# Patient Record
Sex: FEMALE | Race: WHITE | NOT HISPANIC OR LATINO | Employment: OTHER | URBAN - METROPOLITAN AREA
[De-identification: names, ages, dates, MRNs, and addresses within clinical notes are randomized per-mention and may not be internally consistent; named-entity substitution may affect disease eponyms.]

---

## 2017-02-26 ENCOUNTER — HOSPITAL ENCOUNTER (INPATIENT)
Facility: HOSPITAL | Age: 82
LOS: 8 days | DRG: 871 | End: 2017-03-06
Attending: EMERGENCY MEDICINE | Admitting: EMERGENCY MEDICINE
Payer: MEDICARE

## 2017-02-26 DIAGNOSIS — R78.81 BACTEREMIA: ICD-10-CM

## 2017-02-26 DIAGNOSIS — B95.2 UTI (URINARY TRACT INFECTION) DUE TO ENTEROCOCCUS: ICD-10-CM

## 2017-02-26 DIAGNOSIS — J18.9 PNEUMONIA OF BOTH LOWER LOBES DUE TO INFECTIOUS ORGANISM: Primary | ICD-10-CM

## 2017-02-26 DIAGNOSIS — N30.00 ACUTE CYSTITIS WITHOUT HEMATURIA: ICD-10-CM

## 2017-02-26 DIAGNOSIS — N17.9 AKI (ACUTE KIDNEY INJURY): ICD-10-CM

## 2017-02-26 DIAGNOSIS — I47.20 V-TACH: ICD-10-CM

## 2017-02-26 DIAGNOSIS — F01.50 VASCULAR DEMENTIA WITHOUT BEHAVIORAL DISTURBANCE: Chronic | ICD-10-CM

## 2017-02-26 DIAGNOSIS — R65.21 SEPTIC SHOCK: ICD-10-CM

## 2017-02-26 DIAGNOSIS — J96.91 RESPIRATORY FAILURE WITH HYPOXIA, UNSPECIFIED CHRONICITY: ICD-10-CM

## 2017-02-26 DIAGNOSIS — J96.01 ACUTE RESPIRATORY FAILURE WITH HYPOXIA: ICD-10-CM

## 2017-02-26 DIAGNOSIS — R09.02 HYPOXIA: ICD-10-CM

## 2017-02-26 DIAGNOSIS — I48.91 NEW ONSET A-FIB: ICD-10-CM

## 2017-02-26 DIAGNOSIS — N17.9 ACUTE RENAL FAILURE, UNSPECIFIED ACUTE RENAL FAILURE TYPE: ICD-10-CM

## 2017-02-26 DIAGNOSIS — A41.9 SEPTIC SHOCK: ICD-10-CM

## 2017-02-26 DIAGNOSIS — N39.0 UTI (URINARY TRACT INFECTION) DUE TO ENTEROCOCCUS: ICD-10-CM

## 2017-02-26 DIAGNOSIS — R13.12 OROPHARYNGEAL DYSPHAGIA: ICD-10-CM

## 2017-02-26 LAB
ALBUMIN SERPL BCP-MCNC: 2 G/DL
ALLENS TEST: ABNORMAL
ALP SERPL-CCNC: 114 U/L
ALT SERPL W/O P-5'-P-CCNC: 10 U/L
ANION GAP SERPL CALC-SCNC: 9 MMOL/L
AST SERPL-CCNC: 20 U/L
BACTERIA #/AREA URNS HPF: ABNORMAL /HPF
BASOPHILS # BLD AUTO: 0.02 K/UL
BASOPHILS NFR BLD: 0.2 %
BILIRUB SERPL-MCNC: 0.6 MG/DL
BILIRUB UR QL STRIP: ABNORMAL
BNP SERPL-MCNC: 442 PG/ML
BUN SERPL-MCNC: 57 MG/DL
CALCIUM SERPL-MCNC: 8.8 MG/DL
CHLORIDE SERPL-SCNC: 106 MMOL/L
CK SERPL-CCNC: 28 U/L
CLARITY UR: CLEAR
CO2 SERPL-SCNC: 24 MMOL/L
COLOR UR: YELLOW
CREAT SERPL-MCNC: 1.6 MG/DL
DELSYS: ABNORMAL
DIFFERENTIAL METHOD: ABNORMAL
EOSINOPHIL # BLD AUTO: 0 K/UL
EOSINOPHIL NFR BLD: 0 %
ERYTHROCYTE [DISTWIDTH] IN BLOOD BY AUTOMATED COUNT: 13.7 %
EST. GFR  (AFRICAN AMERICAN): 32 ML/MIN/1.73 M^2
EST. GFR  (NON AFRICAN AMERICAN): 28 ML/MIN/1.73 M^2
FIO2: 32
FLOW: 3
FLUAV AG SPEC QL IA: NEGATIVE
FLUBV AG SPEC QL IA: NEGATIVE
GLUCOSE SERPL-MCNC: 130 MG/DL
GLUCOSE UR QL STRIP: NEGATIVE
HCO3 UR-SCNC: 23 MMOL/L (ref 24–28)
HCT VFR BLD AUTO: 34.3 %
HGB BLD-MCNC: 11.4 G/DL
HGB UR QL STRIP: ABNORMAL
HYALINE CASTS #/AREA URNS LPF: 0 /LPF
KETONES UR QL STRIP: NEGATIVE
LACTATE SERPL-SCNC: 1.1 MMOL/L
LACTATE SERPL-SCNC: 1.4 MMOL/L
LEUKOCYTE ESTERASE UR QL STRIP: ABNORMAL
LYMPHOCYTES # BLD AUTO: 1.4 K/UL
LYMPHOCYTES NFR BLD: 13.1 %
MCH RBC QN AUTO: 30.8 PG
MCHC RBC AUTO-ENTMCNC: 33.2 %
MCV RBC AUTO: 93 FL
MICROSCOPIC COMMENT: ABNORMAL
MODE: ABNORMAL
MONOCYTES # BLD AUTO: 1.5 K/UL
MONOCYTES NFR BLD: 14.6 %
NEUTROPHILS # BLD AUTO: 7.4 K/UL
NEUTROPHILS NFR BLD: 73.2 %
NITRITE UR QL STRIP: NEGATIVE
PCO2 BLDA: 42.7 MMHG (ref 35–45)
PH SMN: 7.34 [PH] (ref 7.35–7.45)
PH UR STRIP: 6 [PH] (ref 5–8)
PLATELET # BLD AUTO: 272 K/UL
PLATELET BLD QL SMEAR: ABNORMAL
PMV BLD AUTO: 9.1 FL
PO2 BLDA: 58 MMHG (ref 80–100)
POC BE: -3 MMOL/L
POC SATURATED O2: 88 % (ref 95–100)
POTASSIUM SERPL-SCNC: 4.3 MMOL/L
PROT SERPL-MCNC: 6.3 G/DL
PROT UR QL STRIP: ABNORMAL
RBC # BLD AUTO: 3.7 M/UL
RBC #/AREA URNS HPF: 4 /HPF (ref 0–4)
SAMPLE: ABNORMAL
SITE: ABNORMAL
SODIUM SERPL-SCNC: 139 MMOL/L
SP GR UR STRIP: 1.02 (ref 1–1.03)
SPECIMEN SOURCE: NORMAL
TROPONIN I SERPL DL<=0.01 NG/ML-MCNC: 0.04 NG/ML
URN SPEC COLLECT METH UR: ABNORMAL
UROBILINOGEN UR STRIP-ACNC: 1 EU/DL
WBC # BLD AUTO: 10.31 K/UL
WBC #/AREA URNS HPF: 50 /HPF (ref 0–5)
WBC CLUMPS URNS QL MICRO: ABNORMAL

## 2017-02-26 PROCEDURE — 20000000 HC ICU ROOM

## 2017-02-26 PROCEDURE — 25000003 PHARM REV CODE 250: Performed by: NURSE PRACTITIONER

## 2017-02-26 PROCEDURE — 25000003 PHARM REV CODE 250: Performed by: EMERGENCY MEDICINE

## 2017-02-26 PROCEDURE — 87205 SMEAR GRAM STAIN: CPT

## 2017-02-26 PROCEDURE — 99291 CRITICAL CARE FIRST HOUR: CPT | Mod: 25

## 2017-02-26 PROCEDURE — 93005 ELECTROCARDIOGRAM TRACING: CPT

## 2017-02-26 PROCEDURE — 87070 CULTURE OTHR SPECIMN AEROBIC: CPT

## 2017-02-26 PROCEDURE — 87088 URINE BACTERIA CULTURE: CPT

## 2017-02-26 PROCEDURE — 85025 COMPLETE CBC W/AUTO DIFF WBC: CPT

## 2017-02-26 PROCEDURE — 51702 INSERT TEMP BLADDER CATH: CPT

## 2017-02-26 PROCEDURE — 31720 CLEARANCE OF AIRWAYS: CPT

## 2017-02-26 PROCEDURE — 99291 CRITICAL CARE FIRST HOUR: CPT | Mod: ,,, | Performed by: NURSE PRACTITIONER

## 2017-02-26 PROCEDURE — 81000 URINALYSIS NONAUTO W/SCOPE: CPT

## 2017-02-26 PROCEDURE — 96366 THER/PROPH/DIAG IV INF ADDON: CPT

## 2017-02-26 PROCEDURE — 82550 ASSAY OF CK (CPK): CPT

## 2017-02-26 PROCEDURE — 87186 SC STD MICRODIL/AGAR DIL: CPT

## 2017-02-26 PROCEDURE — 27000221 HC OXYGEN, UP TO 24 HOURS

## 2017-02-26 PROCEDURE — 94640 AIRWAY INHALATION TREATMENT: CPT

## 2017-02-26 PROCEDURE — 84484 ASSAY OF TROPONIN QUANT: CPT

## 2017-02-26 PROCEDURE — 27100108

## 2017-02-26 PROCEDURE — 63600175 PHARM REV CODE 636 W HCPCS: Performed by: EMERGENCY MEDICINE

## 2017-02-26 PROCEDURE — 25000242 PHARM REV CODE 250 ALT 637 W/ HCPCS: Performed by: EMERGENCY MEDICINE

## 2017-02-26 PROCEDURE — 87077 CULTURE AEROBIC IDENTIFY: CPT | Mod: 59

## 2017-02-26 PROCEDURE — 83880 ASSAY OF NATRIURETIC PEPTIDE: CPT

## 2017-02-26 PROCEDURE — 96368 THER/DIAG CONCURRENT INF: CPT

## 2017-02-26 PROCEDURE — 80053 COMPREHEN METABOLIC PANEL: CPT

## 2017-02-26 PROCEDURE — 99900026 HC AIRWAY MAINTENANCE (STAT)

## 2017-02-26 PROCEDURE — 96365 THER/PROPH/DIAG IV INF INIT: CPT | Mod: 59

## 2017-02-26 PROCEDURE — 87400 INFLUENZA A/B EACH AG IA: CPT | Mod: 59

## 2017-02-26 PROCEDURE — 82803 BLOOD GASES ANY COMBINATION: CPT

## 2017-02-26 PROCEDURE — 36600 WITHDRAWAL OF ARTERIAL BLOOD: CPT

## 2017-02-26 PROCEDURE — 63600175 PHARM REV CODE 636 W HCPCS: Performed by: NURSE PRACTITIONER

## 2017-02-26 PROCEDURE — 87086 URINE CULTURE/COLONY COUNT: CPT

## 2017-02-26 PROCEDURE — 99900035 HC TECH TIME PER 15 MIN (STAT)

## 2017-02-26 PROCEDURE — 87040 BLOOD CULTURE FOR BACTERIA: CPT

## 2017-02-26 PROCEDURE — 25000242 PHARM REV CODE 250 ALT 637 W/ HCPCS: Performed by: NURSE PRACTITIONER

## 2017-02-26 PROCEDURE — 83605 ASSAY OF LACTIC ACID: CPT

## 2017-02-26 PROCEDURE — 93010 ELECTROCARDIOGRAM REPORT: CPT | Mod: ,,, | Performed by: INTERNAL MEDICINE

## 2017-02-26 RX ORDER — FUROSEMIDE 40 MG/1
40 TABLET ORAL DAILY PRN
Status: ON HOLD | COMMUNITY
End: 2017-03-06 | Stop reason: HOSPADM

## 2017-02-26 RX ORDER — LOPERAMIDE HYDROCHLORIDE 2 MG/1
2 CAPSULE ORAL 4 TIMES DAILY PRN
Status: ON HOLD | COMMUNITY
End: 2017-03-06 | Stop reason: HOSPADM

## 2017-02-26 RX ORDER — IPRATROPIUM BROMIDE AND ALBUTEROL SULFATE 2.5; .5 MG/3ML; MG/3ML
3 SOLUTION RESPIRATORY (INHALATION)
Status: DISCONTINUED | OUTPATIENT
Start: 2017-02-26 | End: 2017-03-06 | Stop reason: HOSPADM

## 2017-02-26 RX ORDER — HEPARIN SODIUM 5000 [USP'U]/ML
5000 INJECTION, SOLUTION INTRAVENOUS; SUBCUTANEOUS EVERY 8 HOURS
Status: DISCONTINUED | OUTPATIENT
Start: 2017-02-26 | End: 2017-03-05

## 2017-02-26 RX ORDER — NAPROXEN SODIUM 220 MG/1
81 TABLET, FILM COATED ORAL DAILY
Status: ON HOLD | COMMUNITY
End: 2017-03-06 | Stop reason: HOSPADM

## 2017-02-26 RX ORDER — LINEZOLID 2 MG/ML
600 INJECTION, SOLUTION INTRAVENOUS
Status: DISCONTINUED | OUTPATIENT
Start: 2017-02-26 | End: 2017-02-28

## 2017-02-26 RX ORDER — CIPROFLOXACIN 2 MG/ML
400 INJECTION, SOLUTION INTRAVENOUS
Status: DISCONTINUED | OUTPATIENT
Start: 2017-02-26 | End: 2017-02-28

## 2017-02-26 RX ORDER — FAMOTIDINE 10 MG/ML
20 INJECTION INTRAVENOUS DAILY
Status: DISCONTINUED | OUTPATIENT
Start: 2017-02-27 | End: 2017-02-27

## 2017-02-26 RX ORDER — MEROPENEM AND SODIUM CHLORIDE 500 MG/50ML
500 INJECTION, SOLUTION INTRAVENOUS
Status: DISCONTINUED | OUTPATIENT
Start: 2017-02-26 | End: 2017-02-26

## 2017-02-26 RX ORDER — ONDANSETRON 2 MG/ML
4 INJECTION INTRAMUSCULAR; INTRAVENOUS EVERY 8 HOURS PRN
Status: DISCONTINUED | OUTPATIENT
Start: 2017-02-26 | End: 2017-03-06 | Stop reason: HOSPADM

## 2017-02-26 RX ORDER — MEROPENEM AND SODIUM CHLORIDE 500 MG/50ML
500 INJECTION, SOLUTION INTRAVENOUS
Status: DISCONTINUED | OUTPATIENT
Start: 2017-02-27 | End: 2017-02-28

## 2017-02-26 RX ORDER — LORATADINE 10 MG/1
10 TABLET ORAL DAILY
Status: ON HOLD | COMMUNITY
End: 2017-03-06 | Stop reason: HOSPADM

## 2017-02-26 RX ORDER — ACETAMINOPHEN 325 MG/1
650 TABLET ORAL EVERY 6 HOURS PRN
Status: DISCONTINUED | OUTPATIENT
Start: 2017-02-26 | End: 2017-03-06 | Stop reason: HOSPADM

## 2017-02-26 RX ORDER — ACETAMINOPHEN 325 MG/1
325 TABLET ORAL EVERY 6 HOURS PRN
COMMUNITY

## 2017-02-26 RX ORDER — BISACODYL 10 MG
10 SUPPOSITORY, RECTAL RECTAL DAILY PRN
Status: DISCONTINUED | OUTPATIENT
Start: 2017-02-26 | End: 2017-03-06 | Stop reason: HOSPADM

## 2017-02-26 RX ORDER — CARVEDILOL 12.5 MG/1
12.5 TABLET ORAL 2 TIMES DAILY WITH MEALS
COMMUNITY

## 2017-02-26 RX ORDER — SODIUM CHLORIDE 9 MG/ML
INJECTION, SOLUTION INTRAVENOUS CONTINUOUS
Status: DISCONTINUED | OUTPATIENT
Start: 2017-02-26 | End: 2017-03-03

## 2017-02-26 RX ORDER — IPRATROPIUM BROMIDE AND ALBUTEROL SULFATE 2.5; .5 MG/3ML; MG/3ML
3 SOLUTION RESPIRATORY (INHALATION)
Status: COMPLETED | OUTPATIENT
Start: 2017-02-26 | End: 2017-02-26

## 2017-02-26 RX ORDER — OMEPRAZOLE 20 MG/1
20 CAPSULE, DELAYED RELEASE ORAL DAILY
COMMUNITY

## 2017-02-26 RX ORDER — ATORVASTATIN CALCIUM 20 MG/1
20 TABLET, FILM COATED ORAL DAILY
Status: ON HOLD | COMMUNITY
End: 2017-03-06 | Stop reason: HOSPADM

## 2017-02-26 RX ORDER — TRAMADOL HYDROCHLORIDE 50 MG/1
50 TABLET ORAL EVERY 6 HOURS PRN
COMMUNITY

## 2017-02-26 RX ORDER — MELOXICAM 7.5 MG/1
7.5 TABLET ORAL DAILY
Status: ON HOLD | COMMUNITY
End: 2017-03-06 | Stop reason: HOSPADM

## 2017-02-26 RX ADMIN — MEROPENEM AND SODIUM CHLORIDE 500 MG: 500 INJECTION, SOLUTION INTRAVENOUS at 12:02

## 2017-02-26 RX ADMIN — LINEZOLID 600 MG: 600 INJECTION, SOLUTION INTRAVENOUS at 05:02

## 2017-02-26 RX ADMIN — IPRATROPIUM BROMIDE AND ALBUTEROL SULFATE 3 ML: .5; 3 SOLUTION RESPIRATORY (INHALATION) at 08:02

## 2017-02-26 RX ADMIN — HEPARIN SODIUM 5000 UNITS: 5000 INJECTION, SOLUTION INTRAVENOUS; SUBCUTANEOUS at 10:02

## 2017-02-26 RX ADMIN — SODIUM CHLORIDE 2163 ML: 0.9 INJECTION, SOLUTION INTRAVENOUS at 10:02

## 2017-02-26 RX ADMIN — IPRATROPIUM BROMIDE AND ALBUTEROL SULFATE 3 ML: .5; 3 SOLUTION RESPIRATORY (INHALATION) at 10:02

## 2017-02-26 RX ADMIN — CIPROFLOXACIN 400 MG: 2 INJECTION, SOLUTION INTRAVENOUS at 10:02

## 2017-02-26 RX ADMIN — VANCOMYCIN HYDROCHLORIDE 1 G: 1 INJECTION, POWDER, LYOPHILIZED, FOR SOLUTION INTRAVENOUS at 11:02

## 2017-02-26 RX ADMIN — SODIUM CHLORIDE: 0.9 INJECTION, SOLUTION INTRAVENOUS at 03:02

## 2017-02-26 NOTE — PLAN OF CARE
Problem: Patient Care Overview  Goal: Plan of Care Review  Outcome: Ongoing (interventions implemented as appropriate)  Patient remains on ventimask, vss.  NT suctioned x1.  Daughter and son in law given update at bedside.

## 2017-02-26 NOTE — IP AVS SNAPSHOT
[facility]  [id number] [place] [address] [address] [place] [address]           Patient Discharge Instructions     Our goal is to set you up for success. This packet includes information on your condition, medications, and your home care. It will help you to care for yourself so you don't get sicker and need to go back to the hospital.     Please ask your nurse if you have any questions.        There are many details to remember when preparing to leave the hospital. Here is what you will need to do:    1. Take your medicine. If you are prescribed medications, review your Medication List in the following pages. You may have new medications to  at the pharmacy and others that you'll need to stop taking. Review the instructions for how and when to take your medications. Talk with your doctor or nurses if you are unsure of what to do.     2. Go to your follow-up appointments. Specific follow-up information is listed in the following pages. Your may be contacted by a transition nurse or clinical provider about future appointments. Be sure we have all of the phone numbers to reach you, if needed. Please contact your provider's office if you are unable to make an appointment.     3. Watch for warning signs. Your doctor or nurse will give you detailed warning signs to watch for and when to call for assistance. These instructions may also include educational information about your condition. If you experience any of warning signs to your health, call your doctor.               ** Verify the list of medication(s) below is accurate and up to date. Carry this with you in case of emergency. If your medications have changed, please notify your healthcare provider.             Medication List      START taking these medications        Additional Info                      albuterol-ipratropium 2.5mg-0.5mg/3mL 0.5 mg-3 mg(2.5 mg base)/3 mL nebulizer solution   Commonly known as:  DUO-NEB   Quantity:  10 vial    Refills:  1   Dose:  3 mL    Last time this was given:  3 mLs on 3/6/2017  8:35 AM   Instructions:  Take 3 mLs by nebulization every 6 (six) hours while awake. Rescue     Begin Date    AM    Noon    PM    Bedtime       bisacodyl 10 mg Supp   Commonly known as:  DULCOLAX   Refills:  0   Dose:  10 mg    Instructions:  Place 1 suppository (10 mg total) rectally daily as needed.     Begin Date    AM    Noon    PM    Bedtime       minocycline 100 MG tablet   Commonly known as:  DYNACIN   Quantity:  20 tablet   Refills:  0   Dose:  100 mg    Instructions:  Take 1 tablet (100 mg total) by mouth every 12 (twelve) hours.     Begin Date    AM    Noon    PM    Bedtime         CONTINUE taking these medications        Additional Info                      acetaminophen 325 MG tablet   Commonly known as:  TYLENOL   Refills:  0   Dose:  325 mg    Instructions:  Take 325 mg by mouth every 6 (six) hours as needed for Pain.     Begin Date    AM    Noon    PM    Bedtime       carvedilol 12.5 MG tablet   Commonly known as:  COREG   Refills:  0   Dose:  12.5 mg    Instructions:  Take 12.5 mg by mouth 2 (two) times daily with meals.     Begin Date    AM    Noon    PM    Bedtime       omeprazole 20 MG capsule   Commonly known as:  PRILOSEC   Refills:  0   Dose:  20 mg    Instructions:  Take 20 mg by mouth once daily.     Begin Date    AM    Noon    PM    Bedtime       tramadol 50 mg tablet   Commonly known as:  ULTRAM   Refills:  0   Dose:  50 mg    Instructions:  Take 50 mg by mouth every 6 (six) hours as needed for Pain.     Begin Date    AM    Noon    PM    Bedtime         STOP taking these medications     aspirin 81 MG Chew       atorvastatin 20 MG tablet   Commonly known as:  LIPITOR       furosemide 40 MG tablet   Commonly known as:  LASIX       loperamide 2 mg capsule   Commonly known as:  IMODIUM       loratadine 10 mg tablet   Commonly known as:  CLARITIN       meloxicam 7.5 MG tablet   Commonly known as:  MOBIC            Where  to Get Your Medications      These medications were sent to [id number] - [place], [place] - [address]  [address] [facility] [address]     Phone:  [phone]     albuterol-ipratropium 2.5mg-0.5mg/3mL 0.5 mg-3 mg(2.5 mg base)/3 mL nebulizer solution    minocycline 100 MG tablet         You can get these medications from any pharmacy     You don't need a prescription for these medications     bisacodyl 10 mg Supp                  Please bring to all follow up appointments:    1. A copy of your discharge instructions.  2. All medicines you are currently taking in their original bottles.  3. Identification and insurance card.    Please arrive 15 minutes ahead of scheduled appointment time.    Please call 24 hours in advance if you must reschedule your appointment and/or time.        Follow-up Information     Follow up with [facility].    Specialties:  Nursing Home Agency, SNF Agency    Why:  Nursing Home    Contact information:    [address] [facility] [address] [phone]          Follow up with [facility] .    Why:  Hospice at [facility]    Contact information:    [address] [place]  [address] [phone]        Discharge Instructions     Future Orders    Activity as tolerated     Diet general     Scheduling Instructions:    Pureed diet    Questions:    Total calories:      Fat restriction, if any:      Protein restriction, if any:      Na restriction, if any:      Fluid restriction:      Additional restrictions:          Primary Diagnosis     Your primary diagnosis was:  Pneumonia Of Both Lower Lobes Due To Infectious Organism      Admission Information     Date & Time Provider Department [facility]    2/26/2017  9:39 AM Wyatt Schilling MD [facility] -  [id number]      Care Providers     Provider Role Specialty Primary office phone    Wyatt Schilling MD Attending Provider General Practice [phone]    Harinder Quinones MD Consulting  Physician  Infectious Diseases [phone]    Wyatt Schilling MD Team Attending  General Practice [phone]      Important Medicare Message          Most Recent Value    Important Message from Medicare Regarding Discharge Appeal Rights  Given to patient/caregiver, Explained to patient/caregiver, Signed/date by patient/caregiver yes 03/03/2017 1059      Your Vitals Were     BP Pulse Temp Resp Height Weight    127/54 (BP Location: Right arm, Patient Position: Lying, BP Method: Automatic) 81 97.9 °F (36.6 °C) (Oral) 18 5' (1.524 m) 73.9 kg (162 lb 14.7 oz)    SpO2 BMI             98% 31.82 kg/m2         Recent Lab Values     No lab values to display.      Allergies as of 3/6/2017        Reactions    Lidocaine     Hallucinations    Penicillins Rash    Sulfa (Sulfonamide Antibiotics) Rash      [facility]     [facility] Nurse Care Line - 24/7 Assistance  Unless otherwise directed by your provider, please contact [facility], our nurse care line that is available for 24/7 assistance.     Registered nurses in the [facility] provide clinical advisement, health education, appointment booking, and other advisory services.  Call for this free service at [phone].        Advance Directives     An advance directive is a document which, in the event you are no longer able to make decisions for yourself, tells your healthcare team what kind of treatment you do or do not want to receive, or who you would like to make those decisions for you.  If you do not currently have an advance directive, [facility] encourages you to create one.  For more information call:  [phone]), [phone] ([phone]),  or log on to [email or web address].        Language Assistance Services     ATTENTION: Language assistance services are available, free of charge. Please call [phone].      ATENCIÓN: Si habla español, tiene a moreno disposición servicios gratuitos de asistencia lingüística. Llame al  [phone].     [facility] Ý: N?u b?n nói Ti?ng Vi?t, có các d?ch v? h? tr? ngôn ng? mi?n phí dành cho b?n. G?i s? [phone].        Pneumonmia Discharge Instructions                [facility] Sign-Up     Activating your [facility] account is as easy as 1-2-3!     1) Visit [email or web address], select Sign Up Now, enter this activation code and your date of birth, then select Next.  [email or web address]  Expires: 4/20/2017 11:49 AM      2) Create a username and password to use when you visit [facility] in the future and select a security question in case you lose your password and select Next.    3) Enter your e-mail address and click Sign Up!    Additional Information  If you have questions, please e-mail [email or web address] or call [phone] to talk to our [facility] staff. Remember, [facility] is NOT to be used for urgent needs. For medical emergencies, dial 911.          [facility] complies with applicable Federal civil rights laws and does not discriminate on the basis of race, color, national origin, age, disability, or sex.

## 2017-02-26 NOTE — CONSULTS
Clinical Pharmacy Consult Note: Vancomycin     Pharmacy consulted by Dr. Luciano to dose vancomycin for treatment of pneumonia.     89 y.o. female admitted from nursing home for pneumonia     The patient has the following labs and vitals:    Date: 2/26/2017   SCer: Estimated Creatinine Clearance: 19.5 mL/min (based on Cr of 1.6).     Lab Results   Component Value Date    BUN 57 (H) 02/26/2017        Ideal BW: 45.5 Kg   Actual BW: 72.1 Kg   Adjusted BW: 56.1 Kg     Will use 56.1 Kg for dosing weight.     Lab Results   Component Value Date    WBC 10.31 02/26/2017      Tmax/24h: 99.8   Cultures: Blood drawn 02/26/2017      Anti-Infectives:     Vancomycin (day 1 of therapy)   Meropenem (day 1 of therapy)  Ciprofloxacin (day 1 of therapy)    Based on Estimated Creatinine Clearance: 19.5 mL/min (based on Cr of 1.6). and weight of 56.1 Kg, pharmacy will initiate vancomycin pulse dosing 1000 mg IV ED one time with a placeholder dose entered for 750 mg IV every 48 hours.      Vancomycin random level due 2/27/17 at 0430 with AM labs. This will be to establish the patient's actual vancomycin clearance. Calculated half-life elimination is 40.8 hours.     Pharmacy will continue to follow patients clinical status, renal function, C&S, and adjust dose as necessary to maintain trough levels between 15 and 20 mcg/mL.     Thank you for allowing us to participate in this patient's care.     Amalia Gottlieb   2/26/2017 11:47 AM

## 2017-02-26 NOTE — ED NOTES
Pt deep suctioned with a 14 F by RT Layne. Large amount of copious secretions suctioned, pt's o2 sat and breath sounds immediately improved

## 2017-02-26 NOTE — ASSESSMENT & PLAN NOTE
Sec to Pneumonia and UTI  S/p Volume Resuscitation-- doing much better  May need pressors but she is DNR  Continue triple Abx

## 2017-02-26 NOTE — PROGRESS NOTES
Called to patient's room to reevaluate patient. BBS coarse/rhonci (no wheezing heard). Spo2 86-88% on Venti mask 15L 55%. Oral tracheal suctioned patient w/a 14 Occitan suction catheter. Suctioning tolerated well. Suctioned a very large amount of yellow-white sputum. Post suctioning, Spo2 93% on Venti mask 15L 55%. Patient states she feels better. BBS improved post suctioning.

## 2017-02-26 NOTE — ED PROVIDER NOTES
SCRIBE #1 NOTE: I, Sonya Meyers, am scribing for, and in the presence of, Iban Luciano MD. I have scribed the entire note.      History      Chief Complaint   Patient presents with    Shortness of Breath     short of breath with crackles, nonproductive cough x1 week, fever       Review of patient's allergies indicates:   Allergen Reactions    Lidocaine      Hallucinations    Penicillins Rash    Sulfa (sulfonamide antibiotics) Rash        HPI   HPI    2/26/2017, 10:19 AM   History obtained from the family      History of Present Illness: Amy Guzman is a 89 y.o. female patient who presents to the Emergency Department for SOB which onset gradually 1 week ago. Per [facility], patient spO2 was 60% at the nursing home. They report upon their arrival her spO2 was 77% on 4L O2. They report giving patient a DuoNeb treatment, Albuterol, and 125mg Solumedrol. Sx have been constant and moderate in severity. No mitigating or exacerbating factors reported. Associated sx include dry cough and subjective fever. Pt denies any chills, fatigue, nausea, vomiting, CP, leg pain/swelling, and all other sx. No further complaints or concerns at this time.        Arrival mode: [facility]    PCP: No primary care provider on file.     Past Medical History:  Past medical history reviewed not relevant      Past Surgical History:  Past surgical history reviewed not relevant      Family History:  Family history reviewed not relevant      Social History:  Social History    Social History Main Topics    Social History Main Topics    Smoking status: Unknown if ever smoked    Smokeless tobacco: Unknown if ever used    Alcohol Use: Unknown drinking history    Drug Use: Unknown if ever used    Sexual Activity: Unknown       ROS   Review of Systems   Constitutional: Positive for fever. Negative for chills and fatigue.   HENT: Negative for sore throat.    Respiratory: Positive for cough and shortness of breath.    Cardiovascular: Negative for  chest pain and leg swelling.   Gastrointestinal: Negative for nausea and vomiting.   Genitourinary: Negative for dysuria.   Musculoskeletal: Negative for back pain.        Negative for leg pain.   Skin: Negative for rash.   Neurological: Negative for weakness.   Hematological: Does not bruise/bleed easily.       Physical Exam    Initial Vitals   BP Pulse Resp Temp SpO2   02/26/17 0940 02/26/17 0940 02/26/17 0940 02/26/17 0940 02/26/17 0940   109/55 101 23 99.8 °F (37.7 °C) 96 %      Physical Exam  Nursing Notes and Vital Signs Reviewed.  Constitutional: Patient is in mild distress. Awake and alert. Well-developed and well-nourished.  Head: Atraumatic. Normocephalic.  Eyes: PERRL. EOM intact. Conjunctivae are not pale. No scleral icterus.  ENT: Mucous membranes are moist. Oropharynx is clear and symmetric.    Neck: Supple. Full ROM. No lymphadenopathy.  Cardiovascular: Regular rate. Regular rhythm. No murmurs, rubs, or gallops. Distal pulses are 2+ and symmetric.  Pulmonary/Chest: Mild respiratory distress. Coarse breath sounds. No wheezing, rales, or rhonchi.  Abdominal: Soft and non-distended.  There is no tenderness.  No rebound, guarding, or rigidity. Good bowel sounds.  Genitourinary: No CVA tenderness  Musculoskeletal: Moves all extremities. No obvious deformities. 1+ BLE pitting edema. No calf tenderness.  Skin: Warm and dry.  Neurological:  Alert, awake, and appropriate.  Normal speech.  No acute focal neurological deficits are appreciated.  Psychiatric: Normal affect. Good eye contact. Appropriate in content.    ED Course    Critical Care  Date/Time: 2/26/2017 10:51 AM  Performed by: CIARA REAVES  Authorized by: CIARA REAVES   Direct patient critical care time: 12 minutes  Additional history critical care time: 8 minutes  Ordering / reviewing critical care time: 8 minutes  Documentation critical care time: 8 minutes  Consulting other physicians critical care time: 8 minutes  Consult with family  critical care time: 8 minutes  Other critical care time: 8 minutes  Total critical care time (exclusive of procedural time) : 60 minutes  Critical care time was exclusive of separately billable procedures and treating other patients.  Critical care was necessary to treat or prevent imminent or life-threatening deterioration of the following conditions: respiratory failure (hypoxia).  Critical care was time spent personally by me on the following activities: blood draw for specimens, discussions with consultants, evaluation of patient's response to treatment, obtaining history from patient or surrogate, ordering and review of laboratory studies, pulse oximetry, review of old charts, development of treatment plan with patient or surrogate, interpretation of cardiac output measurements, examination of patient, ordering and performing treatments and interventions, ordering and review of radiographic studies and re-evaluation of patient's condition.        ED Vital Signs:  Vitals:    02/26/17 0940 02/26/17 0952 02/26/17 1002 02/26/17 1003   BP: (!) 109/55 (!) 114/45 (!) 98/44    Pulse: 101 105 99 96   Resp: (!) 23 20 (!) 27 (!) 28   Temp: 99.8 °F (37.7 °C)      TempSrc: Oral      SpO2: 96% (!) 84% (!) 93%    Weight: 72.1 kg (159 lb)      Height: 5' (1.524 m)       02/26/17 1017 02/26/17 1047 02/26/17 1102 02/26/17 1217   BP: (!) 88/35 (!) 93/33 (!) 117/41 (!) 109/42   Pulse: 84 78 82 73   Resp: (!) 27 (!) 25 (!) 26 (!) 25   Temp:       TempSrc:       SpO2: (!) 92% (!) 90% (!) 92% (!) 93%   Weight:       Height:        02/26/17 1232   BP: (!) 137/55   Pulse: 83   Resp: (!) 23   Temp:    TempSrc:    SpO2: 98%   Weight:    Height:        Abnormal Lab Results:  Labs Reviewed   CBC W/ AUTO DIFFERENTIAL - Abnormal; Notable for the following:        Result Value    RBC 3.70 (*)     Hemoglobin 11.4 (*)     Hematocrit 34.3 (*)     MPV 9.1 (*)     Mono # 1.5 (*)     Gran% 73.2 (*)     Lymph% 13.1 (*)     All other components  within normal limits   COMPREHENSIVE METABOLIC PANEL - Abnormal; Notable for the following:     Glucose 130 (*)     BUN, Bld 57 (*)     Creatinine 1.6 (*)     Albumin 2.0 (*)     eGFR if  32 (*)     eGFR if non  28 (*)     All other components within normal limits   B-TYPE NATRIURETIC PEPTIDE - Abnormal; Notable for the following:      (*)     All other components within normal limits   TROPONIN I - Abnormal; Notable for the following:     Troponin I 0.037 (*)     All other components within normal limits   ISTAT PROCEDURE - Abnormal; Notable for the following:     POC PH 7.340 (*)     POC PO2 58 (*)     POC HCO3 23.0 (*)     POC SATURATED O2 88 (*)     All other components within normal limits   CULTURE, BLOOD    Narrative:     Aerobic and anaerobic   CULTURE, BLOOD    Narrative:     Aerobic and anaerobic   INFLUENZA A AND B ANTIGEN   CK   LACTIC ACID, PLASMA   URINALYSIS   LACTIC ACID, PLASMA   LACTIC ACID, PLASMA        All Lab Results:  Results for orders placed or performed during the hospital encounter of 02/26/17   CBC auto differential   Result Value Ref Range    WBC 10.31 3.90 - 12.70 K/uL    RBC 3.70 (L) 4.00 - 5.40 M/uL    Hemoglobin 11.4 (L) 12.0 - 16.0 g/dL    Hematocrit 34.3 (L) 37.0 - 48.5 %    MCV 93 82 - 98 fL    MCH 30.8 27.0 - 31.0 pg    MCHC 33.2 32.0 - 36.0 %    RDW 13.7 11.5 - 14.5 %    Platelets 272 150 - 350 K/uL    MPV 9.1 (L) 9.2 - 12.9 fL    Gran # 7.4 1.8 - 7.7 K/uL    Lymph # 1.4 1.0 - 4.8 K/uL    Mono # 1.5 (H) 0.3 - 1.0 K/uL    Eos # 0.0 0.0 - 0.5 K/uL    Baso # 0.02 0.00 - 0.20 K/uL    Gran% 73.2 (H) 38.0 - 73.0 %    Lymph% 13.1 (L) 18.0 - 48.0 %    Mono% 14.6 4.0 - 15.0 %    Eosinophil% 0.0 0.0 - 8.0 %    Basophil% 0.2 0.0 - 1.9 %    Platelet Estimate Appears normal     Differential Method Automated    Comprehensive metabolic panel   Result Value Ref Range    Sodium 139 136 - 145 mmol/L    Potassium 4.3 3.5 - 5.1 mmol/L    Chloride 106 95 - 110  mmol/L    CO2 24 23 - 29 mmol/L    Glucose 130 (H) 70 - 110 mg/dL    BUN, Bld 57 (H) 10 - 30 mg/dL    Creatinine 1.6 (H) 0.5 - 1.4 mg/dL    Calcium 8.8 8.7 - 10.5 mg/dL    Total Protein 6.3 6.0 - 8.4 g/dL    Albumin 2.0 (L) 3.5 - 5.2 g/dL    Total Bilirubin 0.6 0.1 - 1.0 mg/dL    Alkaline Phosphatase 114 55 - 135 U/L    AST 20 10 - 40 U/L    ALT 10 10 - 44 U/L    Anion Gap 9 8 - 16 mmol/L    eGFR if African American 32 (A) >60 mL/min/1.73 m^2    eGFR if non African American 28 (A) >60 mL/min/1.73 m^2   Influenza antigen Nasopharyngeal Swab   Result Value Ref Range    Influenza A Ag, EIA Negative Negative    Influenza B Ag, EIA Negative Negative    Flu A & B Source Nasopharyngeal Swab    Brain natriuretic peptide   Result Value Ref Range     (H) 0 - 99 pg/mL   CK   Result Value Ref Range    CPK 28 20 - 180 U/L   Troponin I   Result Value Ref Range    Troponin I 0.037 (H) 0.000 - 0.026 ng/mL   Lactic acid, plasma   Result Value Ref Range    Lactate (Lactic Acid) 1.1 0.5 - 2.2 mmol/L   ISTAT PROCEDURE   Result Value Ref Range    POC PH 7.340 (L) 7.35 - 7.45    POC PCO2 42.7 35 - 45 mmHg    POC PO2 58 (LL) 80 - 100 mmHg    POC HCO3 23.0 (L) 24 - 28 mmol/L    POC BE -3 -2 to 2 mmol/L    POC SATURATED O2 88 (L) 95 - 100 %    Sample ARTERIAL     Site RR     Allens Test Pass     DelSys Nasal Can     Mode SPONT     Flow 3     FiO2 32          Imaging Results:  Imaging Results         X-Ray Chest AP Portable (Final result) Result time:  02/26/17 10:06:54    Final result by Colin Arroyo MD (02/26/17 10:06:54)    Impression:     Multifocal pneumonia.            Electronically signed by: COLIN ARROYO MD  Date:     02/26/17  Time:    10:06     Narrative:    Exam: XR CHEST AP PORTABLE    Clinical History: Shortness of breath, acute onset    Findings:     Patchy alveolar infiltrates demonstrated throughout the right lung and in the left lung base.  Aortic atherosclerosis.  No pneumothorax. The cardiac silhouette is within  normal limits.               The EKG was ordered, reviewed, and independently interpreted by the ED provider.  Interpretation time: 9:58 AM  Rate: 100 BPM  Rhythm: normal sinus rhythm  Interpretation: Left anterior fascicular block. No STEMI.           The Emergency Provider reviewed the vital signs and test results, which are outlined above.    ED Discussion     10:25 AM: Re-evaluated pt. I have discussed test results, shared treatment plan, and the need for admission with patient and family at bedside. Pt and family express understanding at this time and agree with all information. All questions answered. Pt and family have no further questions or concerns at this time. Pt is ready for admit.    10:51 AM: Discussed case with Anurag Loza NP ([facility]). Dr. Schilling agrees with current care and management of pt and accepts admission.   Admitting Service: Hospital medicine   Admitting Physician: Dr. Schilling  Admit to: ICU      ED Medication(s):  Medications   ciprofloxacin (CIPRO)400mg/200ml D5W IVPB 400 mg (0 mg Intravenous Stopped 2/26/17 1158)   meropenem-0.9% sodium chloride 500 mg/50 mL IVPB (500 mg Intravenous New Bag 2/26/17 1219)   vancomycin 1 g in dextrose 5 % 250 mL IVPB (ready to mix system) (1 g Intravenous New Bag 2/26/17 1114)   vancomycin (VANCOCIN) 750 mg in dextrose 5 % 250 mL IVPB (750 mg Intravenous Trough Due 30 minutes Before Dose 2/27/17 0400)   albuterol-ipratropium 2.5mg-0.5mg/3mL nebulizer solution 3 mL (3 mLs Nebulization Given 2/26/17 1002)   sodium chloride 0.9% bolus 2,163 mL (2,163 mLs Intravenous New Bag 2/26/17 1025)       New Prescriptions    No medications on file             Medical Decision Making    Medical Decision Making:   Clinical Tests:   Lab Tests: Ordered and Reviewed  Radiological Study: Ordered and Reviewed  Medical Tests: Ordered and Reviewed           Scribe Attestation:   Scribe #1: I performed the above scribed service and the documentation accurately  describes the services I performed. I attest to the accuracy of the note.    Attending:   Physician Attestation Statement for Scribe #1: I, Iban Luciano MD, personally performed the services described in this documentation, as scribed by Sonya Meyers, in my presence, and it is both accurate and complete.          Clinical Impression       ICD-10-CM ICD-9-CM   1. Pneumonia of both lower lobes due to infectious organism J18.9 483.8   2. Hypoxia R09.02 799.02   3. Respiratory failure with hypoxia, unspecified chronicity J96.91 518.81       Disposition:   Disposition: Admitted (ICU)  Condition: Critical         Iban Luciano MD  02/26/17 1233

## 2017-02-26 NOTE — SUBJECTIVE & OBJECTIVE
Past Medical History:   Diagnosis Date    Dementia     GERD (gastroesophageal reflux disease)     Hyperlipidemia     Hypertension     Localized edema     Osteoarthritis     Pneumonia     Rotator cuff tear     UTI (urinary tract infection)        Past Surgical History:   Procedure Laterality Date    esophogus surgery      FEMUR SURGERY      TOTAL KNEE ARTHROPLASTY         Review of patient's allergies indicates:   Allergen Reactions    Lidocaine      Hallucinations    Penicillins Rash    Sulfa (sulfonamide antibiotics) Rash       No current facility-administered medications on file prior to encounter.      No current outpatient prescriptions on file prior to encounter.     Family History     None        Social History Main Topics    Smoking status: Never Smoker    Smokeless tobacco: Not on file    Alcohol use Not on file    Drug use: Not on file    Sexual activity: Not on file     Review of Systems   Constitutional: Positive for activity change, appetite change and fatigue.   HENT: Positive for congestion.    Eyes: Negative.    Respiratory: Positive for cough and shortness of breath.    Cardiovascular: Negative.    Gastrointestinal: Negative.    Endocrine: Negative.    Genitourinary: Positive for decreased urine volume.   Musculoskeletal: Positive for arthralgias and myalgias.   Skin: Positive for pallor.   Neurological: Positive for weakness.   Psychiatric/Behavioral: Positive for confusion.     Objective:     Vital Signs (Most Recent):  Temp: 97.7 °F (36.5 °C) (02/26/17 1431)  Pulse: 61 (02/26/17 1700)  Resp: (!) 21 (02/26/17 1700)  BP: (!) 121/38 (02/26/17 1700)  SpO2: (!) 94 % (02/26/17 1700) Vital Signs (24h Range):  Temp:  [97.7 °F (36.5 °C)-99.8 °F (37.7 °C)] 97.7 °F (36.5 °C)  Pulse:  [] 61  Resp:  [18-28] 21  SpO2:  [84 %-98 %] 94 %  BP: ()/(20-55) 121/38     Weight: 72.7 kg (160 lb 4.4 oz)  Body mass index is 31.3 kg/(m^2).    Physical Exam   Constitutional: She appears  well-developed and well-nourished. No distress.   Elderly lady, AAOx1, pleasantly demented, NAD, has ch LE stasis changes   HENT:   Head: Normocephalic and atraumatic.   Mouth/Throat: Oropharynx is clear and moist.   Dry tongue   Eyes: Conjunctivae and EOM are normal. Pupils are equal, round, and reactive to light.   Neck: Normal range of motion. Neck supple. No JVD present.   Cardiovascular: Normal rate, regular rhythm and intact distal pulses.  Exam reveals no gallop and no friction rub.    Murmur heard.  Pulmonary/Chest: No respiratory distress. She has no wheezes. She has rales.   Obvious chest congestion upon coughing   Abdominal: Soft. Bowel sounds are normal. She exhibits no distension. There is no tenderness.   Genitourinary:   Genitourinary Comments: Deferred, howell in place   Musculoskeletal: She exhibits tenderness. She exhibits no deformity.   + R knee scar, LE stasis changes B   Lymphadenopathy:     She has no cervical adenopathy.   Neurological: She is alert. She has normal reflexes.   Pleasantly disoriented, [place], speech clear   Skin: Skin is warm and dry. Rash noted. She is not diaphoretic.   Psychiatric: She has a normal mood and affect.   Nursing note and vitals reviewed.       Significant Labs:   Blood Culture: No results for input(s): LABBLOO in the last 48 hours.  CBC:   Recent Labs  Lab 02/26/17  0958   WBC 10.31   HGB 11.4*   HCT 34.3*        CMP:   Recent Labs  Lab 02/26/17  0958      K 4.3      CO2 24   *   BUN 57*   CREATININE 1.6*   CALCIUM 8.8   PROT 6.3   ALBUMIN 2.0*   BILITOT 0.6   ALKPHOS 114   AST 20   ALT 10   ANIONGAP 9   EGFRNONAA 28*     Cardiac Markers:   Recent Labs  Lab 02/26/17  0958   *     Lactic Acid:   Recent Labs  Lab 02/26/17  1015 02/26/17  1230   LACTATE 1.1 1.4     Troponin:   Recent Labs  Lab 02/26/17  0958   TROPONINI 0.037*     Urine Culture:   Urine Studies:   Recent Labs  Lab 02/26/17  1224   COLORU Yellow   APPEARANCEUA Clear    PHUR 6.0   SPECGRAV 1.020   PROTEINUA 1+*   GLUCUA Negative   KETONESU Negative   BILIRUBINUA 1+*   OCCULTUA 3+*   NITRITE Negative   UROBILINOGEN 1.0   LEUKOCYTESUR 2+*   RBCUA 4   WBCUA 50*   BACTERIA Moderate*   HYALINECASTS 0     All pertinent labs within the past 24 hours have been reviewed.  Imaging Results         X-Ray Chest AP Portable (Final result) Result time:  02/26/17 10:06:54    Final result by Colin Arroyo MD (02/26/17 10:06:54)    Impression:     Multifocal pneumonia.            Electronically signed by: COLIN ARROYO MD  Date:     02/26/17  Time:    10:06     Narrative:    Exam: XR CHEST AP PORTABLE    Clinical History: Shortness of breath, acute onset    Findings:     Patchy alveolar infiltrates demonstrated throughout the right lung and in the left lung base.  Aortic atherosclerosis.  No pneumothorax. The cardiac silhouette is within normal limits.            Significant Imaging: I have reviewed all pertinent imaging results/findings within the past 24 hours.

## 2017-02-27 PROBLEM — R65.21 SEPTIC SHOCK: Status: RESOLVED | Noted: 2017-02-26 | Resolved: 2017-02-27

## 2017-02-27 PROBLEM — R13.10 SWALLOWING DIFFICULTY: Status: ACTIVE | Noted: 2017-02-27

## 2017-02-27 PROBLEM — N17.9 AKI (ACUTE KIDNEY INJURY): Status: RESOLVED | Noted: 2017-02-26 | Resolved: 2017-02-27

## 2017-02-27 PROBLEM — A41.9 SEPTIC SHOCK: Status: RESOLVED | Noted: 2017-02-26 | Resolved: 2017-02-27

## 2017-02-27 LAB
ANION GAP SERPL CALC-SCNC: 9 MMOL/L
BASOPHILS # BLD AUTO: 0.01 K/UL
BASOPHILS NFR BLD: 0.1 %
BUN SERPL-MCNC: 50 MG/DL
CALCIUM SERPL-MCNC: 8.5 MG/DL
CHLORIDE SERPL-SCNC: 107 MMOL/L
CO2 SERPL-SCNC: 22 MMOL/L
CREAT SERPL-MCNC: 1.1 MG/DL
DIFFERENTIAL METHOD: ABNORMAL
EOSINOPHIL # BLD AUTO: 0 K/UL
EOSINOPHIL NFR BLD: 0.1 %
ERYTHROCYTE [DISTWIDTH] IN BLOOD BY AUTOMATED COUNT: 13.6 %
EST. GFR  (AFRICAN AMERICAN): 50 ML/MIN/1.73 M^2
EST. GFR  (NON AFRICAN AMERICAN): 43 ML/MIN/1.73 M^2
GLUCOSE SERPL-MCNC: 189 MG/DL
HCT VFR BLD AUTO: 37.7 %
HGB BLD-MCNC: 12.4 G/DL
LACTATE SERPL-SCNC: 1.2 MMOL/L
LYMPHOCYTES # BLD AUTO: 0.9 K/UL
LYMPHOCYTES NFR BLD: 8.2 %
MCH RBC QN AUTO: 30.9 PG
MCHC RBC AUTO-ENTMCNC: 32.9 %
MCV RBC AUTO: 94 FL
MONOCYTES # BLD AUTO: 0.8 K/UL
MONOCYTES NFR BLD: 6.8 %
NEUTROPHILS # BLD AUTO: 9.5 K/UL
NEUTROPHILS NFR BLD: 85.8 %
PLATELET # BLD AUTO: 258 K/UL
PMV BLD AUTO: 9.9 FL
POTASSIUM SERPL-SCNC: 4.2 MMOL/L
RBC # BLD AUTO: 4.01 M/UL
SODIUM SERPL-SCNC: 138 MMOL/L
WBC # BLD AUTO: 11.15 K/UL

## 2017-02-27 PROCEDURE — 83605 ASSAY OF LACTIC ACID: CPT

## 2017-02-27 PROCEDURE — 63600175 PHARM REV CODE 636 W HCPCS: Performed by: NURSE PRACTITIONER

## 2017-02-27 PROCEDURE — 85025 COMPLETE CBC W/AUTO DIFF WBC: CPT

## 2017-02-27 PROCEDURE — 94640 AIRWAY INHALATION TREATMENT: CPT

## 2017-02-27 PROCEDURE — 36415 COLL VENOUS BLD VENIPUNCTURE: CPT

## 2017-02-27 PROCEDURE — 97802 MEDICAL NUTRITION INDIV IN: CPT

## 2017-02-27 PROCEDURE — 99233 SBSQ HOSP IP/OBS HIGH 50: CPT | Mod: ,,, | Performed by: NURSE PRACTITIONER

## 2017-02-27 PROCEDURE — 92610 EVALUATE SWALLOWING FUNCTION: CPT

## 2017-02-27 PROCEDURE — 25000003 PHARM REV CODE 250: Performed by: EMERGENCY MEDICINE

## 2017-02-27 PROCEDURE — 20000000 HC ICU ROOM

## 2017-02-27 PROCEDURE — 27000221 HC OXYGEN, UP TO 24 HOURS

## 2017-02-27 PROCEDURE — 25000003 PHARM REV CODE 250: Performed by: NURSE PRACTITIONER

## 2017-02-27 PROCEDURE — 25000242 PHARM REV CODE 250 ALT 637 W/ HCPCS: Performed by: NURSE PRACTITIONER

## 2017-02-27 PROCEDURE — 63600175 PHARM REV CODE 636 W HCPCS: Performed by: EMERGENCY MEDICINE

## 2017-02-27 PROCEDURE — 80048 BASIC METABOLIC PNL TOTAL CA: CPT

## 2017-02-27 RX ORDER — NAPROXEN SODIUM 220 MG/1
81 TABLET, FILM COATED ORAL DAILY
Status: DISCONTINUED | OUTPATIENT
Start: 2017-02-28 | End: 2017-03-06 | Stop reason: HOSPADM

## 2017-02-27 RX ORDER — ATORVASTATIN CALCIUM 10 MG/1
20 TABLET, FILM COATED ORAL DAILY
Status: DISCONTINUED | OUTPATIENT
Start: 2017-02-28 | End: 2017-03-06 | Stop reason: HOSPADM

## 2017-02-27 RX ORDER — FAMOTIDINE 20 MG/1
20 TABLET, FILM COATED ORAL DAILY
Status: DISCONTINUED | OUTPATIENT
Start: 2017-02-28 | End: 2017-03-06 | Stop reason: HOSPADM

## 2017-02-27 RX ADMIN — MEROPENEM AND SODIUM CHLORIDE 500 MG: 500 INJECTION, SOLUTION INTRAVENOUS at 12:02

## 2017-02-27 RX ADMIN — FAMOTIDINE 20 MG: 10 INJECTION INTRAVENOUS at 08:02

## 2017-02-27 RX ADMIN — MEROPENEM AND SODIUM CHLORIDE 500 MG: 500 INJECTION, SOLUTION INTRAVENOUS at 11:02

## 2017-02-27 RX ADMIN — LINEZOLID 600 MG: 600 INJECTION, SOLUTION INTRAVENOUS at 04:02

## 2017-02-27 RX ADMIN — HEPARIN SODIUM 5000 UNITS: 5000 INJECTION, SOLUTION INTRAVENOUS; SUBCUTANEOUS at 05:02

## 2017-02-27 RX ADMIN — IPRATROPIUM BROMIDE AND ALBUTEROL SULFATE 3 ML: .5; 3 SOLUTION RESPIRATORY (INHALATION) at 02:02

## 2017-02-27 RX ADMIN — HEPARIN SODIUM 5000 UNITS: 5000 INJECTION, SOLUTION INTRAVENOUS; SUBCUTANEOUS at 01:02

## 2017-02-27 RX ADMIN — IPRATROPIUM BROMIDE AND ALBUTEROL SULFATE 3 ML: .5; 3 SOLUTION RESPIRATORY (INHALATION) at 08:02

## 2017-02-27 RX ADMIN — HEPARIN SODIUM 5000 UNITS: 5000 INJECTION, SOLUTION INTRAVENOUS; SUBCUTANEOUS at 11:02

## 2017-02-27 RX ADMIN — LINEZOLID 600 MG: 600 INJECTION, SOLUTION INTRAVENOUS at 03:02

## 2017-02-27 RX ADMIN — SODIUM CHLORIDE: 0.9 INJECTION, SOLUTION INTRAVENOUS at 02:02

## 2017-02-27 RX ADMIN — CIPROFLOXACIN 400 MG: 2 INJECTION, SOLUTION INTRAVENOUS at 09:02

## 2017-02-27 NOTE — CONSULTS
[facility] -   Adult Nutrition  Consult Note    SUMMARY     Recommendations  Recommendation/Intervention: 1. Advance diet when medically able per SLP recommendations to Puree with thin liquids    2. Add Boost Plus 1 can BID once oral diet resumed  Goals: oral diet or nutrition support within 3 days  Nutrition Goal Status: new  Communication of RD Recs: discussed on rounds    Nutrition Discharge Plan  Back to nursing home on Puree diet with oral nutrition supplement as needed    Reason for Assessment  Reason for Assessment: nurse/nurse practitioner consult (Dysphagia)  Diagnosis:  (Septic Shock, Acute respiratory failure)  Relevent Medical History: HTN, HLD, OA, GERD, Dementia   Interdisciplinary Rounds: attended  General Information Comments: Patient unable to answer questions regarding usual intake or weight. No EPIC records for review. Per reports during rounds patient was having poor intake x1 week prior to admission with difficulty swallowing.    Nutrition Prescription Ordered  Current Diet Order: NPO     Nutrition Risk Screen  Nutrition Risk Screen: dysphagia or difficulty swallowing    Nutrition/Diet History  Typical Food/Fluid Intake: unable to obtain  Factors Affecting Nutritional Intake: difficulty/impaired swallowing    Labs/Tests/Procedures/Meds  Pertinent Labs Reviewed: reviewed  Pertinent Labs Comments: BUN 50, GFR 43, Gluc 189, Alb 2  Pertinent Medications Reviewed: reviewed    Physical Findings  Oral/Mouth Cavity: tooth/teeth missing  Skin:  (Amor 15)    Anthropometrics  Height (inches): 60 in  Weight Method: Bed Scale  Weight (kg): 73.9 kg  Ideal Body Weight (IBW), Female: 100 lb  % Ideal Body Weight, Female (lb): 162.92 lb  BMI (kg/m2): 31.82  BMI Grade: 30 - 34.9- obesity - grade I    Estimated/Assessed Needs  Weight Used For Calorie Calculations: 74 kg (163 lb 2.3 oz)   Height (cm): 152.4 cm  Energy Need Method: Freeborn-St Jeor (x1.2=4675)  Weight Used For Protein Calculations: 74  kg (163 lb 2.3 oz)  0.8 gm Protein (gm): 59.32 and 1.0 gm Protein (gm): 74.15    Monitor and Evaluation  Food and Nutrient Intake: food and beverage intake  Food and Nutrient Adminstration: diet order  Physical Activity and Function: nutrition-related ADLs and IADLs  Anthropometric Measurements: weight  Biochemical Data, Medical Tests and Procedures: electrolyte and renal panel, glucose/endocrine profile    Nutrition Risk  Level of Risk:  (2x weekly)    Nutrition Follow-Up  RD Follow-up?: Yes    Assessment and Plan (PES)  Swallowing difficulty  Nutrition Problem:   Swallowing difficulty    Etiology/Related to:   Slow oral transit time and spitting out food    As evidenced by:  Decreased oral intake with needs for modified consistency diet    Treatment Strategy:   1. Puree diet     Nutrition Diagnosis Status:   New

## 2017-02-27 NOTE — H&P
[facility] Hospital Medicine  History & Physical    Patient Name: Amy Guzman  MRN: [id number]  Admission Date: 2/26/2017  Attending Physician:  Wyatt Schilling MD   Primary Care Provider: No primary care provider on file.         Patient information was obtained from EMS personnel and ER records.     Subjective:     Principal Problem:Septic shock    Chief Complaint:   Chief Complaint   Patient presents with    Shortness of Breath     short of breath with crackles, nonproductive cough x1 week, fever        HPI: Amy Guzman is a 89 y.o. female NH resident with a PMH of HTN, HLD, OA, GERD and Dementia who was brought to ER by EMS for progressive cough and SOB for 1 week. Per [facility], patient spO2 was 60% at the nursing home which increased to 77% on 4L O2. They report giving patient a DuoNeb treatment, Albuterol, and 125mg Solumedrol. Associated with dry cough and subjective fever. Pt denies any chills, fatigue, nausea, vomiting, CP, leg pain/swelling, and all other sx. No further complaints or concerns at this time.       Assessment/Plan:     * Septic shock  Sec to Pneumonia and UTI  S/p Volume Resuscitation-- doing much better  May need pressors but she is DNR  Continue triple Abx    Acute respiratory failure with hypoxia  Sec to Pneumonia  Improved with VM-- will try NC 4 L      Pneumonia of both lower lobes due to infectious organism  B HAP as pt is NH resident  Doesn't appear Aspiration type  Continue triple Abx and IVF and nebs       Acute cystitis without hematuria  Continue IVF and IV Abx      RICK (acute kidney injury)  Sec to Sepsis and IVVD  Rehydrate with NH.       VTE Risk Mitigation         Ordered     heparin (porcine) injection 5,000 Units  Every 8 hours     Route:  Subcutaneous        02/26/17 1505     Medium Risk of VTE  Once      02/26/17 1430     Place sequential compression device  Until discontinued      02/26/17 1430        Wyatt Schilling MD  Department of Hospital Medicine    [facility]

## 2017-02-27 NOTE — PROGRESS NOTES
[facility] Hospital Medicine  Progress Note    Patient Name: Amy Guzman  MRN: [id number]  Patient Class: IP- Inpatient   Admission Date: 2/26/2017  Length of Stay: 1 days  Attending Physician: Wyatt Schilling MD  Primary Care Provider: No primary care provider on file.        Subjective:     Principal Problem:Pneumonia of both lower lobes due to infectious organism    HPI:  Amy Guzman is a 89 y.o. female NH resident with a PMH of HTN, HLD, OA, GERD and Dementia who was brought to ER by EMS for progressive cough and SOB for 1 week. Per [facility], patient spO2 was 60% at the nursing home which increased to 77% on 4L O2. They report giving patient a DuoNeb treatment, Albuterol, and 125mg Solumedrol. Associated with dry cough and subjective fever. Pt denies any chills, fatigue, nausea, vomiting, CP, leg pain/swelling, and all other sx. No further complaints or concerns at this time.     Hospital Course:  In ED creatinine 1.6, PaO2 53, UA+ and CXR with bilat patchy infiltrates.  BP dropped to 88/35 in ED. Sepsis protocol started, given triple IV Abx and IVF vol resuscitation.  Admitted to ICU. No pressors needed. Initial urine cloudy and dirty but now urine clearing up.          Interval History: looks much better, obviously demented, very [place], she passed her Swallow study this am and no Aspiration noted. Also Resp Cx +ve for Staph. She is on triple Abx-- Zyvox and Meropenem/ Cipro and she already got a dose of Vanco yesterday.     She is chronically Bed bound since 2012 after her B TKR. Daughter is at bedside.    Review of Systems   Constitutional: Positive for appetite change and fatigue. Negative for activity change.   HENT: Negative.  Negative for congestion.    Eyes: Negative.    Respiratory: Positive for cough and shortness of breath.    Cardiovascular: Negative.    Gastrointestinal: Negative.    Endocrine: Negative.    Genitourinary: Positive for decreased urine volume.   Musculoskeletal:  Positive for arthralgias and myalgias.   Skin: Positive for pallor.   Neurological: Positive for weakness.   Psychiatric/Behavioral: Negative for confusion.     Objective:     Vital Signs (Most Recent):  Temp: 97.5 °F (36.4 °C) (02/27/17 1105)  Pulse: (!) 59 (02/27/17 1105)  Resp: 20 (02/27/17 1000)  BP: (!) 136/104 (02/27/17 1105)  SpO2: 95 % (02/27/17 1105) Vital Signs (24h Range):  Temp:  [97.5 °F (36.4 °C)-97.9 °F (36.6 °C)] 97.5 °F (36.4 °C)  Pulse:  [35-83] 59  Resp:  [18-23] 20  SpO2:  [87 %-100 %] 95 %  BP: ()/() 136/104     Weight: 73.9 kg (162 lb 14.7 oz)  Body mass index is 31.82 kg/(m^2).    Intake/Output Summary (Last 24 hours) at 02/27/17 1244  Last data filed at 02/27/17 1155   Gross per 24 hour   Intake          5512.33 ml   Output              619 ml   Net          4893.33 ml      Physical Exam   Constitutional: She appears well-developed and well-nourished. No distress.   Elderly lady, AAOx1, pleasantly demented, NAD, has ch LE stasis changes   HENT:   Head: Normocephalic and atraumatic.   Mouth/Throat: Oropharynx is clear and moist.   Dry tongue   Eyes: Conjunctivae and EOM are normal. Pupils are equal, round, and reactive to light.   Neck: Normal range of motion. Neck supple. No JVD present.   Cardiovascular: Normal rate, regular rhythm and intact distal pulses.  Exam reveals no gallop and no friction rub.    Murmur heard.  Pulmonary/Chest: No respiratory distress. She has no wheezes. She has rales.   Obvious chest congestion upon coughing   Abdominal: Soft. Bowel sounds are normal. She exhibits no distension. There is no tenderness.   Genitourinary:   Genitourinary Comments: Deferred, howell in place   Musculoskeletal: She exhibits tenderness. She exhibits no deformity.   + R knee scar, LE stasis changes B   Lymphadenopathy:     She has no cervical adenopathy.   Neurological: She is alert. She has normal reflexes.   Pleasantly disoriented, [place], speech clear   Skin: Skin is warm and  dry. Rash noted. She is not diaphoretic.   Psychiatric: She has a normal mood and affect. Her behavior is normal.   Nursing note and vitals reviewed.      Significant Labs:    Blood Culture:   Recent Labs  Lab 02/26/17  1015 02/26/17  1030   LABBLOO No Growth to date No Growth to date     BMP:   Recent Labs  Lab 02/27/17  0612   *      K 4.2      CO2 22*   BUN 50*   CREATININE 1.1   CALCIUM 8.5*     CBC:   Recent Labs  Lab 02/26/17  0958 02/27/17  0612   WBC 10.31 11.15   HGB 11.4* 12.4   HCT 34.3* 37.7    258     Respiratory Culture:   Recent Labs  Lab 02/26/17  1644   GSRESP >10 epithelial cells per low power field  Few WBC's  Few Gram negative rods  Few Gram positive rods  Many Gram positive cocci   RESPIRATORYC STAPHYLOCOCCUS AUREUSManySusceptibility pending       All pertinent labs within the past 24 hours have been reviewed.    Imaging Results         X-Ray Chest 1 View (Final result) Result time:  02/27/17 09:00:17    Final result by Víctor Mcdowell III, MD (02/27/17 09:00:17)    Impression:           Mildly worsening multilobar pneumonia.      Electronically signed by: VÍCTOR MCDOWELL MD  Date:     02/27/17  Time:    09:00     Narrative:    XR CHEST 1 VIEW    Clinical Indication: Pneumonia.    Comparison: 02/26/2017    Findings: There has been mild interval worsening of the alveolar infiltrates in the right upper lobe and left lower lobe.  There has been no significant interval change of the right lower lobe airspace consolidation. No   infiltrate, pneumothorax or other new pulmonary disease is identified.  Stable cardiomegaly.  Stable thickening of the right upper peritracheal stripe.            X-Ray Chest AP Portable (Final result) Result time:  02/26/17 10:06:54    Final result by Colin Arroyo MD (02/26/17 10:06:54)    Impression:     Multifocal pneumonia.            Electronically signed by: COLIN ARROYO MD  Date:     02/26/17  Time:    10:06     Narrative:    Exam: XR CHEST  AP PORTABLE    Clinical History: Shortness of breath, acute onset    Findings:     Patchy alveolar infiltrates demonstrated throughout the right lung and in the left lung base.  Aortic atherosclerosis.  No pneumothorax. The cardiac silhouette is within normal limits.            Significant Imaging: I have reviewed all pertinent imaging results/findings within the past 24 hours.    Assessment/Plan:      Septic shock, resolved as of 2/27/2017  Sec to Pneumonia and UTI  S/p Volume Resuscitation-- improved   May need pressors but she is DNR  Continue triple Abx      Acute respiratory failure with hypoxia  Sec to Pneumonia  Still on 55 % VM-- will try to wean to NC 4 L      * Pneumonia of both lower lobes due to infectious organism  B HAP as pt is NH resident  Doesn't appear Aspiration type  Continue triple Abx and IVF and nebs   ID consult to de escalate Abx      Acute cystitis without hematuria  Continue IVF and IV Abx  NGTD so far    Vascular dementia without behavioral disturbance  Advance, NH resident, bed bound, needs total care.      Swallowing difficulty  No aspiration on Swallow study      VTE Risk Mitigation         Ordered     heparin (porcine) injection 5,000 Units  Every 8 hours     Route:  Subcutaneous        02/26/17 1505     Medium Risk of VTE  Once      02/26/17 1430     Place sequential compression device  Until discontinued      02/26/17 1430          Wyatt Schilling MD  Department of Hospital Medicine   [facility]

## 2017-02-27 NOTE — PLAN OF CARE
Problem: Patient Care Overview  Goal: Interdisciplinary Rounds/Family Conf  Outcome: Ongoing (interventions implemented as appropriate)  Patient remained stable throughout the shift, SBP greater than 90, tmax 98.0, NSR, SB at times but asymptomatic. POC discuss with patient. Will continue to monitor patient.

## 2017-02-27 NOTE — PLAN OF CARE
Problem: Patient Care Overview  Goal: Plan of Care Review  POC reviewed with pt and daughter. Pt was able to wean from VMto 4L NC. Swallow eval completed pt passed with puree diet. Awaiting Med/Tele transfer.

## 2017-02-27 NOTE — SUBJECTIVE & OBJECTIVE
Interval History: looks much better, obviously demented, very [place], she passed her Swallow study this am and no Aspiration noted. Also Resp Cx +ve for Staph. She is on triple Abx-- Zyvox and Meropenem/ Cipro and she already got a dose of Vanco yesterday.     She is chronically Bed bound since 2012 after her B TKR. Daughter is at bedside.    Review of Systems   Constitutional: Positive for appetite change and fatigue. Negative for activity change.   HENT: Negative.  Negative for congestion.    Eyes: Negative.    Respiratory: Positive for cough and shortness of breath.    Cardiovascular: Negative.    Gastrointestinal: Negative.    Endocrine: Negative.    Genitourinary: Positive for decreased urine volume.   Musculoskeletal: Positive for arthralgias and myalgias.   Skin: Positive for pallor.   Neurological: Positive for weakness.   Psychiatric/Behavioral: Negative for confusion.     Objective:     Vital Signs (Most Recent):  Temp: 97.5 °F (36.4 °C) (02/27/17 1105)  Pulse: (!) 59 (02/27/17 1105)  Resp: 20 (02/27/17 1000)  BP: (!) 136/104 (02/27/17 1105)  SpO2: 95 % (02/27/17 1105) Vital Signs (24h Range):  Temp:  [97.5 °F (36.4 °C)-97.9 °F (36.6 °C)] 97.5 °F (36.4 °C)  Pulse:  [35-83] 59  Resp:  [18-23] 20  SpO2:  [87 %-100 %] 95 %  BP: ()/() 136/104     Weight: 73.9 kg (162 lb 14.7 oz)  Body mass index is 31.82 kg/(m^2).    Intake/Output Summary (Last 24 hours) at 02/27/17 1244  Last data filed at 02/27/17 1155   Gross per 24 hour   Intake          5512.33 ml   Output              619 ml   Net          4893.33 ml      Physical Exam   Constitutional: She appears well-developed and well-nourished. No distress.   Elderly lady, AAOx1, pleasantly demented, NAD, has ch LE stasis changes   HENT:   Head: Normocephalic and atraumatic.   Mouth/Throat: Oropharynx is clear and moist.   Dry tongue   Eyes: Conjunctivae and EOM are normal. Pupils are equal, round, and reactive to light.   Neck: Normal range of motion. Neck  supple. No JVD present.   Cardiovascular: Normal rate, regular rhythm and intact distal pulses.  Exam reveals no gallop and no friction rub.    Murmur heard.  Pulmonary/Chest: No respiratory distress. She has no wheezes. She has rales.   Obvious chest congestion upon coughing   Abdominal: Soft. Bowel sounds are normal. She exhibits no distension. There is no tenderness.   Genitourinary:   Genitourinary Comments: Deferred, howell in place   Musculoskeletal: She exhibits tenderness. She exhibits no deformity.   + R knee scar, LE stasis changes B   Lymphadenopathy:     She has no cervical adenopathy.   Neurological: She is alert. She has normal reflexes.   Pleasantly disoriented, [place], speech clear   Skin: Skin is warm and dry. Rash noted. She is not diaphoretic.   Psychiatric: She has a normal mood and affect. Her behavior is normal.   Nursing note and vitals reviewed.      Significant Labs:    Blood Culture:   Recent Labs  Lab 02/26/17  1015 02/26/17  1030   LABBLOO No Growth to date No Growth to date     BMP:   Recent Labs  Lab 02/27/17  0612   *      K 4.2      CO2 22*   BUN 50*   CREATININE 1.1   CALCIUM 8.5*     CBC:   Recent Labs  Lab 02/26/17  0958 02/27/17  0612   WBC 10.31 11.15   HGB 11.4* 12.4   HCT 34.3* 37.7    258     Respiratory Culture:   Recent Labs  Lab 02/26/17  1644   GSRESP >10 epithelial cells per low power field  Few WBC's  Few Gram negative rods  Few Gram positive rods  Many Gram positive cocci   RESPIRATORYC STAPHYLOCOCCUS AUREUSManySusceptibility pending       All pertinent labs within the past 24 hours have been reviewed.    Imaging Results         X-Ray Chest 1 View (Final result) Result time:  02/27/17 09:00:17    Final result by Víctor Mcdowell III, MD (02/27/17 09:00:17)    Impression:           Mildly worsening multilobar pneumonia.      Electronically signed by: VÍCTOR MCDOWELL MD  Date:     02/27/17  Time:    09:00     Narrative:    XR CHEST 1 VIEW    Clinical  Indication: Pneumonia.    Comparison: 02/26/2017    Findings: There has been mild interval worsening of the alveolar infiltrates in the right upper lobe and left lower lobe.  There has been no significant interval change of the right lower lobe airspace consolidation. No   infiltrate, pneumothorax or other new pulmonary disease is identified.  Stable cardiomegaly.  Stable thickening of the right upper peritracheal stripe.            X-Ray Chest AP Portable (Final result) Result time:  02/26/17 10:06:54    Final result by Colin Arroyo MD (02/26/17 10:06:54)    Impression:     Multifocal pneumonia.            Electronically signed by: COLIN ARROYO MD  Date:     02/26/17  Time:    10:06     Narrative:    Exam: XR CHEST AP PORTABLE    Clinical History: Shortness of breath, acute onset    Findings:     Patchy alveolar infiltrates demonstrated throughout the right lung and in the left lung base.  Aortic atherosclerosis.  No pneumothorax. The cardiac silhouette is within normal limits.            Significant Imaging: I have reviewed all pertinent imaging results/findings within the past 24 hours.

## 2017-02-27 NOTE — PLAN OF CARE
Problem: Patient Care Overview  Goal: Plan of Care Review  Outcome: Ongoing (interventions implemented as appropriate)  Recommendation/Intervention: 1. Advance diet when medically able per SLP recommendations to Puree with thin liquids 2. Add Boost Plus 1 can BID once oral diet resumed  Goals: oral diet or nutrition support within 3 days  Nutrition Goal Status: new  Communication of RD Recs: discussed on rounds

## 2017-02-27 NOTE — ASSESSMENT & PLAN NOTE
Sec to Pneumonia and UTI  S/p Volume Resuscitation-- improved   May need pressors but she is DNR  Continue triple Abx

## 2017-02-27 NOTE — ASSESSMENT & PLAN NOTE
B HAP as pt is NH resident  Doesn't appear Aspiration type  Continue triple Abx and IVF and nebs   ID consult to de escalate Abx

## 2017-02-27 NOTE — PT/OT/SLP EVAL
Speech Language Pathology  Evaluation    Amy Guzman   MRN: [id number]   Admitting Diagnosis: Septic shock    Diet recommendations: Solid Diet Level: Puree  Liquid Diet Level: Thin HOB to 90 degrees, Small bites/sips and 1 bite/sip at a time    SLP Treatment Date: 17  Speech Start Time: 0900     Speech Stop Time: 917     Speech Total (min): 17 min       TREATMENT BILLABLE MINUTES:  Eval Swallow and Oral Function 17    Diagnosis: Septic shock      Past Medical History:   Diagnosis Date    Dementia     GERD (gastroesophageal reflux disease)     Hyperlipidemia     Hypertension     Localized edema     Osteoarthritis     Pneumonia     Rotator cuff tear     UTI (urinary tract infection)      Past Surgical History:   Procedure Laterality Date    esophogus surgery      FEMUR SURGERY      TOTAL KNEE ARTHROPLASTY         Has the patient been evaluated by SLP for swallowing? : Yes  Keep patient NPO?: No   General Precautions: Standard, fall, hearing impaired    Current Respiratory Status: nasal cannula     Social Hx: Patient lives in nursing home.    Prior diet: unknown    Occupational/hobbies/homemaking: unknown    Subjective:  Pt cooperative and alert.  Patient goals: none stated.    Pain Ratin/10              Pain Rating Post-Intervention: 0/10    Objective:   Patient found with: peripheral IV, telemetry, oxygen  Swallow assessed at bedside. Pt tolerated puree and thin liquids via cup and straw without s/s of aspiration. Laryngeal elevation was WFL.  Pt was given a bite of cracker which she did not attempt to chew and expelled it. Recommend po diet of puree and thin liquids.   Oral Musculature Evaluation  Oral Musculature: WFL  Dentition: edentulous  Mucosal Quality: good     Bedside Swallow Eval:  Consistencies Assessed: Thin liquids via cup and straw, Puree via spoon and Soft solids bite of cracker  Oral Phase: decreased closure around utensil and slow oral transit time spitting out of  food  Pharyngeal Phase: no overt clinical  signs/symptoms of aspiration and no overt clinical signs/symptoms of pharyngeal dysphagia    Additional Treatment:      FIM:                                 Assessment:  Amy Guzman is a 89 y.o. female with a medical diagnosis of Septic shock and presents with safe swallow.          Discharge recommendations:       Goals:   SLP Goals     Not on file           Plan:   Patient to be seen     Plan of Care expires:    Plan of Care reviewed with: patient  SLP Follow-up?: No              Polly Bang CCC-SLP  02/27/2017

## 2017-02-27 NOTE — ASSESSMENT & PLAN NOTE
Nutrition Problem:   Swallowing difficulty    Etiology/Related to:   Patient with possible aspiration per SLP note    As evidenced by:  Decreased oral intake with signs of possible aspiration during SLP evaluation    Treatment Strategy:   1. Oral diet when medically able  2. If unable to resume oral diet within 48 hours consider alternate nutrition support    Nutrition Diagnosis Status:   New

## 2017-02-27 NOTE — PLAN OF CARE
Phoned daughter, patient is a resident at [facility]. Plan is for patient to return to the nursing home at discharge, per daughter.      02/27/17 2985   Discharge Assessment   Assessment Type Discharge Planning Assessment   Confirmed/corrected address and phone number on facesheet? Yes   Assessment information obtained from? Medical Record;Other  (Enrico Ibrahim, daughter, [phone])   Prior to hospitilization cognitive status: Unable to Assess   Prior to hospitalization functional status: Partially Dependent   Current cognitive status: Unable to Assess   Current Functional Status: Partially Dependent   Arrived From nursing home  ([facility])   Lives With facility resident   Able to Return to Prior Arrangements yes   Is patient able to care for self after discharge? No   Patient's perception of discharge disposition nursing home   Readmission Within The Last 30 Days no previous admission in last 30 days   Patient currently being followed by outpatient case management? No   Patient currently receives home health services? No   Does the patient currently use HME? No   Do you have any problems affording any of your prescribed medications? No   Is the patient taking medications as prescribed? yes   Do you have any financial concerns preventing you from receiving the healthcare you need? No   Does the patient have transportation to healthcare appointments? Yes   Transportation Available agency transportation   On Dialysis? No   Discharge Plan A Return to nursing home   Patient/Family In Agreement With Plan yes

## 2017-02-27 NOTE — ASSESSMENT & PLAN NOTE
B HAP as pt is NH resident  Doesn't appear Aspiration type  Continue triple Abx and IVF and nebs

## 2017-02-27 NOTE — PROGRESS NOTES
Progress Note  Critical Care    Admit Date: 2/26/2017   LOS: 1 day     Follow-up For: PNA and Shock     SUBJECTIVE:   Change over last 24 hours: BP stable overnight and O2 demand has not worsened.  No dyspnea.      ROS:  Review of Systems   Constitutional: Negative for chills, fever and malaise/fatigue.   HENT: Negative for congestion.    Eyes: Negative for blurred vision.   Respiratory: Positive for cough. Negative for sputum production and shortness of breath.    Cardiovascular: Negative for chest pain and leg swelling.   Gastrointestinal: Negative for abdominal pain, nausea and vomiting.   Genitourinary: Negative for dysuria.   Musculoskeletal: Negative for myalgias.   Skin: Negative for rash.   Neurological: Positive for weakness. Negative for dizziness, focal weakness and headaches.   Endo/Heme/Allergies: Does not bruise/bleed easily.   Psychiatric/Behavioral: The patient is not nervous/anxious.        Continuous Infusions:   sodium chloride 0.9% 100 mL/hr at 02/27/17 1045     Review of patient's allergies indicates:   Allergen Reactions    Lidocaine      Hallucinations    Penicillins Rash    Sulfa (sulfonamide antibiotics) Rash       OBJECTIVE:     Vital Signs (Most Recent)  Temp: 97.5 °F (36.4 °C) (02/27/17 1105)  Pulse: (!) 59 (02/27/17 1105)  Resp: 20 (02/27/17 1000)  BP: (!) 136/104 (02/27/17 1105)  SpO2: 95 % (02/27/17 1105)    Vital Signs Range (Last 24H):  Temp:  [97.5 °F (36.4 °C)-97.9 °F (36.6 °C)]   Pulse:  [35-83]   Resp:  [18-23]   BP: ()/()   SpO2:  [87 %-100 %]     I & O (Last 24H):  Intake/Output Summary (Last 24 hours) at 02/27/17 1230  Last data filed at 02/27/17 1155   Gross per 24 hour   Intake          5512.33 ml   Output              619 ml   Net          4893.33 ml        Oxygen Concentration (%):  [] 55    Physical Exam:  Physical Exam   Constitutional: She is well-developed, well-nourished, and in no distress. Vital signs are normal. She appears not lethargic and  to not be writhing in pain. She appears unhealthy.  Non-toxic appearance. No distress.   HENT:   Head: Normocephalic and atraumatic.   Neck: Neck supple.   Cardiovascular: Normal rate and regular rhythm.    Pulses:       Radial pulses are 2+ on the right side, and 2+ on the left side.        Dorsalis pedis pulses are 1+ on the right side, and 1+ on the left side.   Capillary refill < 3 seconds   Pulmonary/Chest: No accessory muscle usage. No tachypnea. No respiratory distress. She has decreased breath sounds. She has rales in the right middle field and the right lower field.   Abdominal: Soft. She exhibits no distension. Bowel sounds are hypoactive. There is no tenderness.   Genitourinary:   Genitourinary Comments: Hook    Musculoskeletal: Normal range of motion.   No edema   Lymphadenopathy:     She has no cervical adenopathy.   Neurological: She is alert. She appears not lethargic.   Orientation X person and place.  Mild confusion.  Cooperative.    Skin: Skin is warm, dry and intact. She is not diaphoretic. No cyanosis.   Psychiatric: Affect and judgment normal. Her mood appears not anxious. She exhibits abnormal recent memory.       Laboratory (Last 24H):  CBC:    Recent Labs  Lab 02/27/17  0612   WBC 11.15   HGB 12.4   HCT 37.7        CMP:    Recent Labs  Lab 02/27/17  0612   CALCIUM 8.5*      K 4.2   CO2 22*      BUN 50*   CREATININE 1.1       ABGs:   Recent Labs  Lab 02/26/17  1003   PH 7.340*   PCO2 42.7   HCO3 23.0*   POCSATURATED 88*   BE -3     Microbiology Results (last 7 days)     Procedure Component Value Units Date/Time    Culture, Respiratory with Gram Stain [[id number]] Collected:  02/26/17 1644    Order Status:  Completed Specimen:  Respiratory from Endotracheal Aspirate Updated:  02/27/17 0974     Respiratory Culture --     STAPHYLOCOCCUS AUREUS  Many  Susceptibility pending       Gram Stain (Respiratory) >10 epithelial cells per low power field     Gram Stain (Respiratory) Few  WBC's     Gram Stain (Respiratory) Few Gram negative rods     Gram Stain (Respiratory) Few Gram positive rods     Gram Stain (Respiratory) Many Gram positive cocci    Blood culture x two cultures. Draw prior to antibiotics. [[id number]] Collected:  02/26/17 1030    Order Status:  Completed Specimen:  Blood from Peripheral, Hand, Left Updated:  02/27/17 0145     Blood Culture, Routine No Growth to date    Narrative:       Aerobic and anaerobic    Blood culture x two cultures. Draw prior to antibiotics. [[id number]] Collected:  02/26/17 1015    Order Status:  Completed Specimen:  Blood from Peripheral, Forearm, Left Updated:  02/27/17 0115     Blood Culture, Routine No Growth to date    Narrative:       Aerobic and anaerobic    Urine culture [[id number]] Collected:  02/26/17 1514    Order Status:  Sent Specimen:  Urine from Urine, Catheterized Updated:  02/26/17 2005          Chest X-Ray:         Film and report reviewed:  There has been mild interval worsening of the alveolar infiltrates in the right upper lobe and left lower lobe.  There has been no significant interval change of the right lower lobe airspace consolidation. No infiltrate, pneumothorax or other new pulmonary disease is identified.  Stable cardiomegaly.  Stable thickening of the right upper peritracheal stripe.    ASSESSMENT/PLAN:     Problem   Pneumonia of Both Lower Lobes Due to Infectious Organism   Acute Respiratory Failure With Hypoxia   Acute Cystitis Without Hematuria   Swallowing Difficulty   Vascular Dementia Without Behavioral Disturbance   Septic Shock (Resolved)   Clifford (Acute Kidney Injury) (Resolved)       PLAN:    1. Neuro: Neuro monitoring     2. Pulmonary: Encourage C&DB and OOB asap. Cont O2, Duoneb, IVAB and NT suction prn. Repeat CXR in AM and continue Aspiration precautions     3. Cardiac: Cardiac monitoring. BP improved and stable post volume resuscitation, continue maintenance IVFs dec rate.      4. Renal: Hook in place, monitor  I/Os      5. Infectious Disease: Follow fever curve. Blood and urine cultures NGTD.  Sputum + gm+cocci but ID&S pending.  Cont Zyvox, Merrem and Cipro.      6. Hematology/Oncology: Monitor for bleeding     7. Endocrine: monitor glucose stable at 130     8. Fluids/Electrolytes/Nutrition/GI: ST eval done rec pureed diet.  Dec IVFs.      9. Musculoskeletal: ROM     10. Pain Management: no issues      11. Discharge and Palliative Care: Plan return to NH     Preventive Measures and Monitoring:   Stress Ulcer: Pepcid  Nutrition: Pureed diet  Glucose control: stable  Bowel prophylaxis: PRN Dulcolax  DVT prophylaxis: SQ Hep/SCDs  Hx CAD on B-Blocker: no hx CAD  Head of Bed/Reposition: Elevate HOB and turn Q1-2 hours   Early Mobility: OOB once O2 demand improves  Hook Day: #2  IVAB Day: #2  Code Status: DNR  Pneumonia Vaccine: assume UTD at NH  Flu Vaccine: assume UTD at NH    Counseling/Consultation: I have discussed the care of this patient in detail with multidisciplinary team during rounds, bedside nursing staff and Dr. Camcaho and Dr. Schilling.  Discussed care in detail with daughter at bedside and all questions answered.     Will transfer to [facility] and follow from pulmonary standpoint.  VSS no hypotension or Alt MS. WILBERTO [facility]

## 2017-02-28 PROBLEM — N39.0 UTI (URINARY TRACT INFECTION) DUE TO ENTEROCOCCUS: Status: ACTIVE | Noted: 2017-02-28

## 2017-02-28 PROBLEM — N17.9 ACUTE RENAL FAILURE: Status: ACTIVE | Noted: 2017-02-28

## 2017-02-28 PROBLEM — J96.01 ACUTE RESPIRATORY FAILURE WITH HYPOXIA: Status: RESOLVED | Noted: 2017-02-26 | Resolved: 2017-02-28

## 2017-02-28 PROBLEM — I48.91 NEW ONSET A-FIB: Status: ACTIVE | Noted: 2017-02-28

## 2017-02-28 PROBLEM — B95.2 UTI (URINARY TRACT INFECTION) DUE TO ENTEROCOCCUS: Status: ACTIVE | Noted: 2017-02-28

## 2017-02-28 LAB
ANION GAP SERPL CALC-SCNC: 7 MMOL/L
BACTERIA UR CULT: NORMAL
BASOPHILS # BLD AUTO: 0.09 K/UL
BASOPHILS NFR BLD: 0.5 %
BUN SERPL-MCNC: 41 MG/DL
CALCIUM SERPL-MCNC: 8.7 MG/DL
CHLORIDE SERPL-SCNC: 107 MMOL/L
CO2 SERPL-SCNC: 21 MMOL/L
CREAT SERPL-MCNC: 0.9 MG/DL
DIFFERENTIAL METHOD: ABNORMAL
EOSINOPHIL # BLD AUTO: 0 K/UL
EOSINOPHIL NFR BLD: 0.1 %
ERYTHROCYTE [DISTWIDTH] IN BLOOD BY AUTOMATED COUNT: 13.6 %
EST. GFR  (AFRICAN AMERICAN): >60 ML/MIN/1.73 M^2
EST. GFR  (NON AFRICAN AMERICAN): 55 ML/MIN/1.73 M^2
GLUCOSE SERPL-MCNC: 233 MG/DL
HCT VFR BLD AUTO: 38.4 %
HGB BLD-MCNC: 12.9 G/DL
LYMPHOCYTES # BLD AUTO: 1.1 K/UL
LYMPHOCYTES NFR BLD: 5.8 %
MAGNESIUM SERPL-MCNC: 1.8 MG/DL
MCH RBC QN AUTO: 31 PG
MCHC RBC AUTO-ENTMCNC: 33.6 %
MCV RBC AUTO: 92 FL
MONOCYTES # BLD AUTO: 0.9 K/UL
MONOCYTES NFR BLD: 4.5 %
NEUTROPHILS # BLD AUTO: 17.2 K/UL
NEUTROPHILS NFR BLD: 90.9 %
PLATELET # BLD AUTO: 333 K/UL
PMV BLD AUTO: 9.7 FL
POTASSIUM SERPL-SCNC: 4 MMOL/L
RBC # BLD AUTO: 4.16 M/UL
SODIUM SERPL-SCNC: 135 MMOL/L
WBC # BLD AUTO: 19.24 K/UL

## 2017-02-28 PROCEDURE — 25000003 PHARM REV CODE 250: Performed by: NURSE PRACTITIONER

## 2017-02-28 PROCEDURE — 25000003 PHARM REV CODE 250: Performed by: INTERNAL MEDICINE

## 2017-02-28 PROCEDURE — 21400001 HC TELEMETRY ROOM

## 2017-02-28 PROCEDURE — 93010 ELECTROCARDIOGRAM REPORT: CPT | Mod: ,,, | Performed by: INTERNAL MEDICINE

## 2017-02-28 PROCEDURE — 63600175 PHARM REV CODE 636 W HCPCS: Performed by: INTERNAL MEDICINE

## 2017-02-28 PROCEDURE — 94640 AIRWAY INHALATION TREATMENT: CPT

## 2017-02-28 PROCEDURE — 87040 BLOOD CULTURE FOR BACTERIA: CPT

## 2017-02-28 PROCEDURE — 85025 COMPLETE CBC W/AUTO DIFF WBC: CPT

## 2017-02-28 PROCEDURE — 87040 BLOOD CULTURE FOR BACTERIA: CPT | Mod: 59

## 2017-02-28 PROCEDURE — 96372 THER/PROPH/DIAG INJ SC/IM: CPT

## 2017-02-28 PROCEDURE — 99232 SBSQ HOSP IP/OBS MODERATE 35: CPT | Mod: ,,, | Performed by: INTERNAL MEDICINE

## 2017-02-28 PROCEDURE — 63600175 PHARM REV CODE 636 W HCPCS: Performed by: EMERGENCY MEDICINE

## 2017-02-28 PROCEDURE — 25000242 PHARM REV CODE 250 ALT 637 W/ HCPCS: Performed by: NURSE PRACTITIONER

## 2017-02-28 PROCEDURE — 11000001 HC ACUTE MED/SURG PRIVATE ROOM

## 2017-02-28 PROCEDURE — 83735 ASSAY OF MAGNESIUM: CPT

## 2017-02-28 PROCEDURE — 27000221 HC OXYGEN, UP TO 24 HOURS

## 2017-02-28 PROCEDURE — 63600175 PHARM REV CODE 636 W HCPCS: Performed by: NURSE PRACTITIONER

## 2017-02-28 PROCEDURE — 87186 SC STD MICRODIL/AGAR DIL: CPT

## 2017-02-28 PROCEDURE — 25000003 PHARM REV CODE 250: Performed by: EMERGENCY MEDICINE

## 2017-02-28 PROCEDURE — 36415 COLL VENOUS BLD VENIPUNCTURE: CPT

## 2017-02-28 PROCEDURE — 80048 BASIC METABOLIC PNL TOTAL CA: CPT

## 2017-02-28 PROCEDURE — 87077 CULTURE AEROBIC IDENTIFY: CPT

## 2017-02-28 RX ORDER — METRONIDAZOLE 500 MG/100ML
500 INJECTION, SOLUTION INTRAVENOUS
Status: DISCONTINUED | OUTPATIENT
Start: 2017-02-28 | End: 2017-03-05

## 2017-02-28 RX ORDER — MOXIFLOXACIN HYDROCHLORIDE 400 MG/1
400 TABLET ORAL DAILY
Status: DISCONTINUED | OUTPATIENT
Start: 2017-02-28 | End: 2017-03-01

## 2017-02-28 RX ORDER — METRONIDAZOLE 500 MG/1
500 TABLET ORAL EVERY 8 HOURS
Status: DISCONTINUED | OUTPATIENT
Start: 2017-02-28 | End: 2017-02-28

## 2017-02-28 RX ORDER — DIGOXIN 0.25 MG/ML
250 INJECTION INTRAMUSCULAR; INTRAVENOUS ONCE
Status: COMPLETED | OUTPATIENT
Start: 2017-02-28 | End: 2017-02-28

## 2017-02-28 RX ORDER — LINEZOLID 600 MG/1
600 TABLET, FILM COATED ORAL EVERY 12 HOURS
Status: DISCONTINUED | OUTPATIENT
Start: 2017-02-28 | End: 2017-02-28

## 2017-02-28 RX ORDER — HALOPERIDOL 5 MG/ML
2 INJECTION INTRAMUSCULAR EVERY 4 HOURS PRN
Status: DISCONTINUED | OUTPATIENT
Start: 2017-02-28 | End: 2017-03-02

## 2017-02-28 RX ORDER — LORAZEPAM 2 MG/ML
0.5 INJECTION INTRAMUSCULAR EVERY 6 HOURS PRN
Status: DISCONTINUED | OUTPATIENT
Start: 2017-02-28 | End: 2017-03-02

## 2017-02-28 RX ADMIN — IPRATROPIUM BROMIDE AND ALBUTEROL SULFATE 3 ML: .5; 3 SOLUTION RESPIRATORY (INHALATION) at 01:02

## 2017-02-28 RX ADMIN — MOXIFLOXACIN HYDROCHLORIDE 400 MG: 400 TABLET, FILM COATED ORAL at 07:02

## 2017-02-28 RX ADMIN — HALOPERIDOL LACTATE 2 MG: 5 INJECTION, SOLUTION INTRAMUSCULAR at 05:02

## 2017-02-28 RX ADMIN — HEPARIN SODIUM 5000 UNITS: 5000 INJECTION, SOLUTION INTRAVENOUS; SUBCUTANEOUS at 05:02

## 2017-02-28 RX ADMIN — HEPARIN SODIUM 5000 UNITS: 5000 INJECTION, SOLUTION INTRAVENOUS; SUBCUTANEOUS at 10:02

## 2017-02-28 RX ADMIN — METRONIDAZOLE 500 MG: 500 TABLET ORAL at 05:02

## 2017-02-28 RX ADMIN — LORAZEPAM 0.5 MG: 2 INJECTION, SOLUTION INTRAMUSCULAR; INTRAVENOUS at 06:02

## 2017-02-28 RX ADMIN — DIGOXIN 250 MCG: 0.25 INJECTION INTRAMUSCULAR; INTRAVENOUS at 07:02

## 2017-02-28 RX ADMIN — IPRATROPIUM BROMIDE AND ALBUTEROL SULFATE 3 ML: .5; 3 SOLUTION RESPIRATORY (INHALATION) at 07:02

## 2017-02-28 RX ADMIN — LINEZOLID 600 MG: 600 INJECTION, SOLUTION INTRAVENOUS at 04:02

## 2017-02-28 RX ADMIN — SODIUM CHLORIDE: 0.9 INJECTION, SOLUTION INTRAVENOUS at 03:02

## 2017-02-28 RX ADMIN — ATORVASTATIN CALCIUM 20 MG: 10 TABLET, FILM COATED ORAL at 07:02

## 2017-02-28 RX ADMIN — HALOPERIDOL LACTATE 2 MG: 5 INJECTION, SOLUTION INTRAMUSCULAR at 01:02

## 2017-02-28 RX ADMIN — FAMOTIDINE 20 MG: 20 TABLET ORAL at 07:02

## 2017-02-28 RX ADMIN — HEPARIN SODIUM 5000 UNITS: 5000 INJECTION, SOLUTION INTRAVENOUS; SUBCUTANEOUS at 01:02

## 2017-02-28 RX ADMIN — IPRATROPIUM BROMIDE AND ALBUTEROL SULFATE 3 ML: .5; 3 SOLUTION RESPIRATORY (INHALATION) at 08:02

## 2017-02-28 RX ADMIN — METRONIDAZOLE 500 MG: 500 TABLET ORAL at 01:02

## 2017-02-28 RX ADMIN — VANCOMYCIN HYDROCHLORIDE 1000 MG: 1 INJECTION, POWDER, LYOPHILIZED, FOR SOLUTION INTRAVENOUS at 05:02

## 2017-02-28 RX ADMIN — METRONIDAZOLE 500 MG: 500 INJECTION, SOLUTION INTRAVENOUS at 10:02

## 2017-02-28 RX ADMIN — ASPIRIN 81 MG CHEWABLE TABLET 81 MG: 81 TABLET CHEWABLE at 07:02

## 2017-02-28 NOTE — ASSESSMENT & PLAN NOTE
Urine culture is positive for enterococcus.  Will follow final drug susceptibility of org.  Continue vancomycin

## 2017-02-28 NOTE — SUBJECTIVE & OBJECTIVE
Past Medical History:   Diagnosis Date    Dementia     GERD (gastroesophageal reflux disease)     Hyperlipidemia     Hypertension     Localized edema     Osteoarthritis     Pneumonia     Rotator cuff tear     UTI (urinary tract infection)        Past Surgical History:   Procedure Laterality Date    esophogus surgery      FEMUR SURGERY      TOTAL KNEE ARTHROPLASTY         Review of patient's allergies indicates:   Allergen Reactions    Lidocaine      Hallucinations    Penicillins Rash    Sulfa (sulfonamide antibiotics) Rash       Medications:  Prescriptions Prior to Admission   Medication Sig    acetaminophen (TYLENOL) 325 MG tablet Take 325 mg by mouth every 6 (six) hours as needed for Pain.    aspirin 81 MG Chew Take 81 mg by mouth once daily.    atorvastatin (LIPITOR) 20 MG tablet Take 20 mg by mouth once daily.    carvedilol (COREG) 12.5 MG tablet Take 12.5 mg by mouth 2 (two) times daily with meals.    furosemide (LASIX) 40 MG tablet Take 40 mg by mouth daily as needed.    loperamide (IMODIUM) 2 mg capsule Take 2 mg by mouth 4 (four) times daily as needed for Diarrhea.    loratadine (CLARITIN) 10 mg tablet Take 10 mg by mouth once daily.    meloxicam (MOBIC) 7.5 MG tablet Take 7.5 mg by mouth once daily.    omeprazole (PRILOSEC) 20 MG capsule Take 20 mg by mouth once daily.    tramadol (ULTRAM) 50 mg tablet Take 50 mg by mouth every 6 (six) hours as needed for Pain.     Antibiotics     Start     Stop Route Frequency Ordered    02/28/17 0900  moxifloxacin tablet 400 mg      -- Oral Daily 02/28/17 0452    02/28/17 0600  metronidazole tablet 500 mg      -- Oral Every 8 hours 02/28/17 0452        Antifungals     None        Antivirals     None             There is no immunization history on file for this patient.    Family History     None        Social History     Social History    Marital status:      Spouse name: N/A    Number of children: N/A    Years of education: N/A      Social History Main Topics    Smoking status: Never Smoker    Smokeless tobacco: None    Alcohol use None    Drug use: None    Sexual activity: Not Asked     Other Topics Concern    None     Social History Narrative    None     Travel History:   Has patient traveled outside of the [place]?  No  Has patient traveled outside of [place]? No      Review of Systems  Objective:     Vital Signs (Most Recent):  Temp: 98.2 °F (36.8 °C) (02/28/17 0305)  Pulse: 84 (02/28/17 0305)  Resp: 20 (02/28/17 0305)  BP: (!) 153/64 (02/28/17 0305)  SpO2: 95 % (02/28/17 0305) Vital Signs (24h Range):  Temp:  [97.5 °F (36.4 °C)-98.4 °F (36.9 °C)] 98.2 °F (36.8 °C)  Pulse:  [35-95] 84  Resp:  [15-26] 20  SpO2:  [90 %-100 %] 95 %  BP: (103-195)/() 153/64     Weight: 73.9 kg (162 lb 14.7 oz)  Body mass index is 31.82 kg/(m^2).    Estimated Creatinine Clearance: 28.7 mL/min (based on Cr of 1.1).    Physical Exam    Significant Labs:   Blood Culture:   Recent Labs  Lab 02/26/17  1015 02/26/17  1030   LABBLOO Gram stain aer bottle: Gram positive cocci in clusters resembling Staph  Results called to and read back by:Ree White RN 02/27/2017    17:33 No Growth to date  No Growth to date     CBC:   Recent Labs  Lab 02/26/17  0958 02/27/17  0612   WBC 10.31 11.15   HGB 11.4* 12.4   HCT 34.3* 37.7    258     CMP:   Recent Labs  Lab 02/26/17  0958 02/27/17  0612    138   K 4.3 4.2    107   CO2 24 22*   * 189*   BUN 57* 50*   CREATININE 1.6* 1.1   CALCIUM 8.8 8.5*   PROT 6.3  --    ALBUMIN 2.0*  --    BILITOT 0.6  --    ALKPHOS 114  --    AST 20  --    ALT 10  --    ANIONGAP 9 9   EGFRNONAA 28* 43*     Respiratory Culture:   Recent Labs  Lab 02/26/17  1644   GSRESP >10 epithelial cells per low power field  Few WBC's  Few Gram negative rods  Few Gram positive rods  Many Gram positive cocci   RESPIRATORYC STAPHYLOCOCCUS AUREUSManySusceptibility pending       Significant Imaging: I have  reviewed all pertinent imaging results/findings within the past 24 hours.

## 2017-02-28 NOTE — ASSESSMENT & PLAN NOTE
Respiratory cultures are positive for staph aureus -will follow final drug susceptibility of org.  Will switch to PO avelox,zyvox and flagyl in am .    She needs close monitoring .

## 2017-02-28 NOTE — PLAN OF CARE
Problem: Patient Care Overview  Goal: Plan of Care Review  Outcome: Ongoing (interventions implemented as appropriate)  Alert, and awake majority of shift.  Oriented to self only. Confusion increased as shift progressed, kept repeating self, speaking to self, and asking repetitive questions. Removing cardiac monitoring wires, pulse oximetry probes, etc.  Required frequent redirection. Consuming fluids well.  Will not ask but readily accepts when offered. During shift, at approximately 0500, converted to Atrial Fibrillation with RVR.  Asymptomatic. Will convert to NSR when heart rate slows < 100 but will then convert back to Atrial Fibrillation. Charge RN notified. STAT EKG obtaiined and awaiting morning labs to determine fluid/electrolyte status. Requiring oxygen at 3LPM per NC to maintain oxygen saturations in 90's, quickly desaturates when she removes oxygen cannula. Has BLE edema and legs elevated on pillows.  SCD's on. In no apparent distress.

## 2017-02-28 NOTE — ASSESSMENT & PLAN NOTE
Respiratory cultures are positive for staph aureus -will follow final drug susceptibility of org.  Blood cultures are positive for staph -will follow drug susceptibility of organism.  She needs close monitoring .  Add Vancomycin as she has bacteremia

## 2017-02-28 NOTE — ASSESSMENT & PLAN NOTE
Urine culture is positive for enterococcus.  Will follow final drug susceptibility of [facility].

## 2017-02-28 NOTE — CONSULTS
[facility]  Infectious Disease  Consult Note    Patient Name: Amy Guzman  MRN: [id number]  Admission Date: 2/26/2017  Hospital Length of Stay: 2 days  Attending Physician: Wyatt Schilling MD  Primary Care Provider: No primary care provider on file.     Isolation Status: No active isolations    Patient information was obtained from past medical records and ER records.      Inpatient consult to Infectious Diseases  Consult performed by: VANESSA QUINONES.  Consult ordered by: WYATT SCHILLING        Assessment/Plan:     * Pneumonia of both lower lobes due to infectious organism  Respiratory cultures are positive for staph aureus -will follow final drug susceptibility of org.  Blood cultures are positive for staph   Will use vancomycin and follow drug susceptibility of isolate .  Switch to Avelox/flagyl po in Am   She needs close monitoring .    Acute respiratory failure with hypoxia  Oxygen therapy .  Supportive care .    Acute cystitis without hematuria  Urine culture is positive for enterococcus.  Will follow final drug susceptibility of org.    Acute renal failure-close monitoring .      Thank you for your consult. I will follow-up with patient. Please contact us if you have any additional questions.    Vanessa Quinones MD  Infectious Disease  [facility]    Subjective:     Principal Problem: Pneumonia of both lower lobes due to infectious organism    HPI: 89 year old woman NH resident with history of dementia who was brought to ER by EMS for progressive cough and SOB for 1 week. Per [facility], patient spO2 was 60% at the nursing home which increased to 77% on 4L O2. Since admission, chest x-ray showed multilobar pneumonia. Respiratory cultures are positive for Staph Aureus,urine culture is positive for enterococcus.  She had bedside swallow evaluation and po diet of puree and thin liquid was recommended.  She was seen and examined at bedside.        Past Medical History:   Diagnosis Date     Dementia     GERD (gastroesophageal reflux disease)     Hyperlipidemia     Hypertension     Localized edema     Osteoarthritis     Pneumonia     Rotator cuff tear     UTI (urinary tract infection)        Past Surgical History:   Procedure Laterality Date    esophogus surgery      FEMUR SURGERY      TOTAL KNEE ARTHROPLASTY         Review of patient's allergies indicates:   Allergen Reactions    Lidocaine      Hallucinations    Penicillins Rash    Sulfa (sulfonamide antibiotics) Rash       Medications:  Prescriptions Prior to Admission   Medication Sig    acetaminophen (TYLENOL) 325 MG tablet Take 325 mg by mouth every 6 (six) hours as needed for Pain.    aspirin 81 MG Chew Take 81 mg by mouth once daily.    atorvastatin (LIPITOR) 20 MG tablet Take 20 mg by mouth once daily.    carvedilol (COREG) 12.5 MG tablet Take 12.5 mg by mouth 2 (two) times daily with meals.    furosemide (LASIX) 40 MG tablet Take 40 mg by mouth daily as needed.    loperamide (IMODIUM) 2 mg capsule Take 2 mg by mouth 4 (four) times daily as needed for Diarrhea.    loratadine (CLARITIN) 10 mg tablet Take 10 mg by mouth once daily.    meloxicam (MOBIC) 7.5 MG tablet Take 7.5 mg by mouth once daily.    omeprazole (PRILOSEC) 20 MG capsule Take 20 mg by mouth once daily.    tramadol (ULTRAM) 50 mg tablet Take 50 mg by mouth every 6 (six) hours as needed for Pain.     Antibiotics     Start     Stop Route Frequency Ordered    02/28/17 0900  moxifloxacin tablet 400 mg      -- Oral Daily 02/28/17 0452    02/28/17 0600  metronidazole tablet 500 mg      -- Oral Every 8 hours 02/28/17 0452        Antifungals     None        Antivirals     None             There is no immunization history on file for this patient.    Family History     None        Social History     Social History    Marital status:      Spouse name: N/A    Number of children: N/A    Years of education: N/A     Social History Main Topics    Smoking status:  Never Smoker    Smokeless tobacco: None    Alcohol use None    Drug use: None    Sexual activity: Not Asked     Other Topics Concern    None     Social History Narrative    None     Travel History:   Has patient traveled outside of the [place]?  No  Has patient traveled outside of [place]? No      Review of Systems  Objective:     Vital Signs (Most Recent):  Temp: 98.2 °F (36.8 °C) (02/28/17 0305)  Pulse: 84 (02/28/17 0305)  Resp: 20 (02/28/17 0305)  BP: (!) 153/64 (02/28/17 0305)  SpO2: 95 % (02/28/17 0305) Vital Signs (24h Range):  Temp:  [97.5 °F (36.4 °C)-98.4 °F (36.9 °C)] 98.2 °F (36.8 °C)  Pulse:  [35-95] 84  Resp:  [15-26] 20  SpO2:  [90 %-100 %] 95 %  BP: (103-195)/() 153/64     Weight: 73.9 kg (162 lb 14.7 oz)  Body mass index is 31.82 kg/(m^2).    Estimated Creatinine Clearance: 28.7 mL/min (based on Cr of 1.1).    Physical Exam    Significant Labs:   Blood Culture:   Recent Labs  Lab 02/26/17  1015 02/26/17  1030   LABBLOO Gram stain aer bottle: Gram positive cocci in clusters resembling Staph  Results called to and read back by:Ree White RN 02/27/2017    17:33 No Growth to date  No Growth to date     CBC:   Recent Labs  Lab 02/26/17  0958 02/27/17  0612   WBC 10.31 11.15   HGB 11.4* 12.4   HCT 34.3* 37.7    258     CMP:   Recent Labs  Lab 02/26/17  0958 02/27/17  0612    138   K 4.3 4.2    107   CO2 24 22*   * 189*   BUN 57* 50*   CREATININE 1.6* 1.1   CALCIUM 8.8 8.5*   PROT 6.3  --    ALBUMIN 2.0*  --    BILITOT 0.6  --    ALKPHOS 114  --    AST 20  --    ALT 10  --    ANIONGAP 9 9   EGFRNONAA 28* 43*     Respiratory Culture:   Recent Labs  Lab 02/26/17  1644   GSRESP >10 epithelial cells per low power field  Few WBC's  Few Gram negative rods  Few Gram positive rods  Many Gram positive cocci   RESPIRATORYC STAPHYLOCOCCUS AUREUSManySusceptibility pending       Significant Imaging: I have reviewed all pertinent imaging results/findings  within the past 24 hours.

## 2017-02-28 NOTE — PROGRESS NOTES
Amy Guzman [id number] is a 89 y.o. female who has been consulted for vancomycin dosing.  Consult ordered by Dr Harinder Quinones    Dx: Pneumonia  Therapeutic trough goal = 15-20 mcg/mL  Tmx: 98.4    The patient has the following labs:     Date Creatinine (mg/dl)    BUN WBC Count   2/28/2017 Estimated Creatinine Clearance: 28.7 mL/min (based on Cr of 1.1). Lab Results   Component Value Date    BUN 50 (H) 02/27/2017     Lab Results   Component Value Date    WBC 11.15 02/27/2017      which calculates to an Estimated Creatinine Clearance: 28.7 mL/min (based on Cr of 1.1)..       Current weight is 73.9 kg (162 lb 14.7 oz)    Due to patient's age, weight and CrCl, Pharmacy is dosing this patient using and adjusted body weight of 56.9    The patient will be started on vancomycin at a dose of 1000 mg every 36 hours. We will pulse dose until a therapeutic trough level is reached.  Patient will be followed by pharmacy for changes in renal function, toxicity, and efficacy.      Thank you for allowing us to participate in this patient's care.     Jimbo Muñoz

## 2017-02-28 NOTE — PROGRESS NOTES
Progress Note  Pulmonology    Admit Date: 2/26/2017   LOS: 2 days     SUBJECTIVE:     Follow-up For:    MICU for pneumonia  Weaned to nasal canula  Baseline confused from dementia  Blood culture: Coag -ve staphy  Urine : enterococus  Sputum: Staphy aureus  Chronic bed bound after Efren TKR      Continuous Infusions:   sodium chloride 0.9% 75 mL/hr at 02/28/17 0700     Scheduled Meds:   albuterol-ipratropium 2.5mg-0.5mg/3mL  3 mL Nebulization Q6H WAKE    aspirin  81 mg Oral Daily    atorvastatin  20 mg Oral Daily    famotidine  20 mg Oral Daily    heparin (porcine)  5,000 Units Subcutaneous Q8H    metronidazole  500 mg Oral Q8H    moxifloxacin  400 mg Oral Daily    vancomycin (VANCOCIN) IVPB  1,000 mg Intravenous Q36H       Review of Systems:  Constitutional: no fever or chills  Respiratory: positive for cough  Cardiovascular: no chest pain or palpitations    OBJECTIVE:     Vital Signs Range (Last 24H):  Temp:  [97.5 °F (36.4 °C)-98.4 °F (36.9 °C)]   Pulse:  []   Resp:  [15-27]   BP: (103-195)/()   SpO2:  [84 %-100 %]     I & O (Last 24H):  Intake/Output Summary (Last 24 hours) at 02/28/17 0831  Last data filed at 02/28/17 0605   Gross per 24 hour   Intake          3736.67 ml   Output              542 ml   Net          3194.67 ml     Physical Exam:  General: no distress, appears stated age  Neck: no jugular venous distention  Lungs:  normal respiratory effort, normal percussion bilaterally and diminished breath sounds bibasilar  Chest Wall: no tenderness  Heart: systolic murmur: soft 2/6, medium pitch at 2nd left intercostal space  Abdomen: soft, non-tender non-distended; bowel sounds normal  Extremities: no cyanosis or edema, or clubbing  Neurologic: Normal strength and tone. No focal numbness or weakness  Mental status: alertness: alert    Laboratory:  CBC:   Recent Labs  Lab 02/28/17  0636   WBC 19.24*   RBC 4.16   HGB 12.9   HCT 38.4      MCV 92   MCH 31.0   MCHC 33.6     CMP:   Recent  Labs  Lab 02/26/17  0958  02/28/17  0636   *  < > 233*   CALCIUM 8.8  < > 8.7   ALBUMIN 2.0*  --   --    PROT 6.3  --   --      < > 135*   K 4.3  < > 4.0   CO2 24  < > 21*     < > 107   BUN 57*  < > 41*   CREATININE 1.6*  < > 0.9   ALKPHOS 114  --   --    ALT 10  --   --    AST 20  --   --    BILITOT 0.6  --   --    < > = values in this interval not displayed.  Microbiology Results (last 7 days)     Procedure Component Value Units Date/Time    Blood culture x two cultures. Draw prior to antibiotics. [[id number]] Collected:  02/26/17 1015    Order Status:  Completed Specimen:  Blood from Peripheral, Forearm, Left Updated:  02/28/17 1045     Blood Culture, Routine Gram stain aer bottle: Gram positive cocci in clusters resembling Staph     Blood Culture, Routine Results called to and read back by:Ree White RN 02/27/2017       Blood Culture, Routine 17:33     Blood Culture, Routine --     COAGULASE-NEGATIVE STAPHYLOCOCCUS SPECIES  Organism is a probable contaminant      Narrative:       Aerobic and anaerobic    Blood culture [[id number]] Collected:  02/28/17 0636    Order Status:  Sent Specimen:  Blood Updated:  02/28/17 0643    Blood culture [[id number]] Collected:  02/28/17 0643    Order Status:  Sent Specimen:  Blood Updated:  02/28/17 0643    Blood culture x two cultures. Draw prior to antibiotics. [[id number]] Collected:  02/26/17 1030    Order Status:  Completed Specimen:  Blood from Peripheral, Hand, Left Updated:  02/27/17 2012     Blood Culture, Routine No Growth to date     Blood Culture, Routine No Growth to date    Narrative:       Aerobic and anaerobic    Urine culture [[id number]] Collected:  02/26/17 1514    Order Status:  Completed Specimen:  Urine from Urine, Catheterized Updated:  02/27/17 1959     Urine Culture, Routine --     ENTEROCOCCUS SPECIES  > 100,000 cfu/ml  Identification and susceptibility pending      Culture, Respiratory with Gram Stain [[id number]] Collected:   02/26/17 1644    Order Status:  Completed Specimen:  Respiratory from Endotracheal Aspirate Updated:  02/27/17 0944     Respiratory Culture --     STAPHYLOCOCCUS AUREUS  Many  Susceptibility pending       Gram Stain (Respiratory) >10 epithelial cells per low power field     Gram Stain (Respiratory) Few WBC's     Gram Stain (Respiratory) Few Gram negative rods     Gram Stain (Respiratory) Few Gram positive rods     Gram Stain (Respiratory) Many Gram positive cocci          Recent Labs  Lab 02/26/17  1224   COLORU Yellow   SPECGRAV 1.020   PHUR 6.0   PROTEINUA 1+*   BACTERIA Moderate*   NITRITE Negative   LEUKOCYTESUR 2+*   UROBILINOGEN 1.0   HYALINECASTS 0       ASSESSMENT/PLAN:     Active Hospital Problems    Diagnosis  POA    *Pneumonia of both lower lobes due to infectious organism [J18.9]  Yes     Priority: 2     UTI (urinary tract infection) due to Enterococcus [N39.0, B95.2]  Yes     Priority: 1 - High    Swallowing difficulty [R13.10]  Yes     Priority: 2     Vascular dementia without behavioral disturbance [F01.50]  Yes     Priority: 2      Chronic    Acute renal failure [N17.9]  Yes    Acute cystitis without hematuria [N30.00]  Yes      Resolved Hospital Problems    Diagnosis Date Resolved POA    Acute respiratory failure with hypoxia [J96.01] 02/28/2017 Yes    RICK (acute kidney injury) [N17.9] 02/27/2017 Yes    Septic shock [A41.9, R65.21] 02/27/2017 Yes       Plan:   Continue Vancomycin and Avelox  Duoneb bronchodilators  Avelox and Flagyl per Dr Quinones  Disposition NH    Sign off call for questions    Thank you for the courtesy of participating in the care of this patient    Frandy Camacho MD

## 2017-02-28 NOTE — CONSULTS
[facility]  Infectious Disease  Consult Note    Patient Name: Amy Guzman  MRN: [id number]  Admission Date: 2/26/2017  Hospital Length of Stay: 2 days  Attending Physician: Wyatt Schilling MD  Primary Care Provider: No primary care provider on file.     Isolation Status: No active isolations    Patient information was obtained from past medical records and ER records.      Consults  Assessment/Plan:     * Pneumonia of both lower lobes due to infectious organism  Respiratory cultures are positive for staph aureus -will follow final drug susceptibility of org.  Blood cultures are positive for staph -will follow drug susceptibility of organism.  She needs close monitoring .  Add Vancomycin as she has bacteremia    Acute respiratory failure with hypoxia  Oxygen therapy .  Supportive care .    Acute cystitis without hematuria  Urine culture is positive for enterococcus.  Will follow final drug susceptibility of org.  Continue vancomycin     Vascular dementia without behavioral disturbance  Supportive care     Acute renal failure  IVF, avoid nephrotoxic meds      Thank you for your consult. I will follow-up with patient. Please contact us if you have any additional questions.    Harinder Quinones MD  Infectious Disease  [facility]    Subjective:     Principal Problem: Pneumonia of both lower lobes due to infectious organism    HPI: 89 year old woman NH resident with history of dementia who was brought to ER by EMS for progressive cough and SOB for 1 week. Per [facility], patient spO2 was 60% at the nursing home which increased to 77% on 4L O2. Since admission, chest x-ray showed multilobar pneumonia. Respiratory cultures are positive for Staph Aureus,urine culture is positive for enterococcus.  She had bedside swallow evaluation and po diet of puree and thin liquid was recommended.  She was seen and examined at bedside.        Past Medical History:   Diagnosis Date    Dementia     GERD  (gastroesophageal reflux disease)     Hyperlipidemia     Hypertension     Localized edema     Osteoarthritis     Pneumonia     Rotator cuff tear     UTI (urinary tract infection)        Past Surgical History:   Procedure Laterality Date    esophogus surgery      FEMUR SURGERY      TOTAL KNEE ARTHROPLASTY         Review of patient's allergies indicates:   Allergen Reactions    Lidocaine      Hallucinations    Penicillins Rash    Sulfa (sulfonamide antibiotics) Rash       Medications:  Prescriptions Prior to Admission   Medication Sig    acetaminophen (TYLENOL) 325 MG tablet Take 325 mg by mouth every 6 (six) hours as needed for Pain.    aspirin 81 MG Chew Take 81 mg by mouth once daily.    atorvastatin (LIPITOR) 20 MG tablet Take 20 mg by mouth once daily.    carvedilol (COREG) 12.5 MG tablet Take 12.5 mg by mouth 2 (two) times daily with meals.    furosemide (LASIX) 40 MG tablet Take 40 mg by mouth daily as needed.    loperamide (IMODIUM) 2 mg capsule Take 2 mg by mouth 4 (four) times daily as needed for Diarrhea.    loratadine (CLARITIN) 10 mg tablet Take 10 mg by mouth once daily.    meloxicam (MOBIC) 7.5 MG tablet Take 7.5 mg by mouth once daily.    omeprazole (PRILOSEC) 20 MG capsule Take 20 mg by mouth once daily.    tramadol (ULTRAM) 50 mg tablet Take 50 mg by mouth every 6 (six) hours as needed for Pain.     Antibiotics     Start     Stop Route Frequency Ordered    02/28/17 0900  moxifloxacin tablet 400 mg      -- Oral Daily 02/28/17 0452    02/28/17 0600  metronidazole tablet 500 mg      -- Oral Every 8 hours 02/28/17 0452    02/28/17 0615  vancomycin 1 g in dextrose 5 % 250 mL IVPB (ready to mix system)  (Vancomycin IVPB with levels panel)      -- IV Every 12 hours (non-standard times) 02/28/17 0509        Antifungals     None        Antivirals     None             There is no immunization history on file for this patient.    Family History     None        Social History     Social  History    Marital status:      Spouse name: N/A    Number of children: N/A    Years of education: N/A     Social History Main Topics    Smoking status: Never Smoker    Smokeless tobacco: None    Alcohol use None    Drug use: None    Sexual activity: Not Asked     Other Topics Concern    None     Social History Narrative    None     Travel History:   Has patient traveled outside of the [place]?  Not Relevant  Has patient traveled outside of [place]? Not Relevant      Review of Systems   Unable to perform ROS: Dementia     Objective:     Vital Signs (Most Recent):  Temp: 98.2 °F (36.8 °C) (02/28/17 0305)  Pulse: 84 (02/28/17 0305)  Resp: 20 (02/28/17 0305)  BP: (!) 153/64 (02/28/17 0305)  SpO2: 95 % (02/28/17 0305) Vital Signs (24h Range):  Temp:  [97.5 °F (36.4 °C)-98.4 °F (36.9 °C)] 98.2 °F (36.8 °C)  Pulse:  [35-95] 84  Resp:  [15-26] 20  SpO2:  [90 %-100 %] 95 %  BP: (103-195)/() 153/64     Weight: 73.9 kg (162 lb 14.7 oz)  Body mass index is 31.82 kg/(m^2).    Estimated Creatinine Clearance: 28.7 mL/min (based on Cr of 1.1).    Physical Exam   Constitutional: She appears well-developed and well-nourished. No distress.   Elderly lady, AAOx1, pleasantly demented, NAD, has ch LE stasis changes   HENT:   Head: Normocephalic and atraumatic.   Mouth/Throat: Oropharynx is clear and moist.   Dry tongue   Eyes: Conjunctivae and EOM are normal. Pupils are equal, round, and reactive to light.   Neck: Normal range of motion. Neck supple. No JVD present.   Cardiovascular: Normal rate, regular rhythm and intact distal pulses.  Exam reveals no gallop and no friction rub.    Murmur heard.  Pulmonary/Chest: No respiratory distress. She has no wheezes. She has rales.   Obvious chest congestion upon coughing   Abdominal: Soft. Bowel sounds are normal. She exhibits no distension. There is no tenderness.   Genitourinary:   Genitourinary Comments: Deferred, howell in place   Musculoskeletal: She exhibits  no tenderness or deformity.   + R knee scar, LE stasis changes B   Lymphadenopathy:     She has no cervical adenopathy.   Neurological: She is alert. She has normal reflexes.   Pleasantly disoriented, [place], speech clear   Skin: Skin is warm and dry. No rash noted. She is not diaphoretic.   Psychiatric: She has a normal mood and affect. Her behavior is normal.   Nursing note and vitals reviewed.      Significant Labs:   Blood Culture:   Recent Labs  Lab 02/26/17  1015 02/26/17  1030   LABBLOO Gram stain aer bottle: Gram positive cocci in clusters resembling Staph  Results called to and read back by:Ree White RN 02/27/2017    17:33 No Growth to date  No Growth to date     CBC:   Recent Labs  Lab 02/26/17  0958 02/27/17  0612   WBC 10.31 11.15   HGB 11.4* 12.4   HCT 34.3* 37.7    258     Respiratory Culture:   Recent Labs  Lab 02/26/17  1644   GSRESP >10 epithelial cells per low power field  Few WBC's  Few Gram negative rods  Few Gram positive rods  Many Gram positive cocci   RESPIRATORYC STAPHYLOCOCCUS AUREUSManySusceptibility pending     Urine Culture:   Recent Labs  Lab 02/26/17  1514   LABURIN ENTEROCOCCUS SPECIES> 100,000 cfu/mlIdentification and susceptibility pending       Significant Imaging: I have reviewed all pertinent imaging results/findings within the past 24 hours.

## 2017-02-28 NOTE — SUBJECTIVE & OBJECTIVE
Past Medical History:   Diagnosis Date    Dementia     GERD (gastroesophageal reflux disease)     Hyperlipidemia     Hypertension     Localized edema     Osteoarthritis     Pneumonia     Rotator cuff tear     UTI (urinary tract infection)        Past Surgical History:   Procedure Laterality Date    esophogus surgery      FEMUR SURGERY      TOTAL KNEE ARTHROPLASTY         Review of patient's allergies indicates:   Allergen Reactions    Lidocaine      Hallucinations    Penicillins Rash    Sulfa (sulfonamide antibiotics) Rash       Medications:  Prescriptions Prior to Admission   Medication Sig    acetaminophen (TYLENOL) 325 MG tablet Take 325 mg by mouth every 6 (six) hours as needed for Pain.    aspirin 81 MG Chew Take 81 mg by mouth once daily.    atorvastatin (LIPITOR) 20 MG tablet Take 20 mg by mouth once daily.    carvedilol (COREG) 12.5 MG tablet Take 12.5 mg by mouth 2 (two) times daily with meals.    furosemide (LASIX) 40 MG tablet Take 40 mg by mouth daily as needed.    loperamide (IMODIUM) 2 mg capsule Take 2 mg by mouth 4 (four) times daily as needed for Diarrhea.    loratadine (CLARITIN) 10 mg tablet Take 10 mg by mouth once daily.    meloxicam (MOBIC) 7.5 MG tablet Take 7.5 mg by mouth once daily.    omeprazole (PRILOSEC) 20 MG capsule Take 20 mg by mouth once daily.    tramadol (ULTRAM) 50 mg tablet Take 50 mg by mouth every 6 (six) hours as needed for Pain.     Antibiotics     Start     Stop Route Frequency Ordered    02/28/17 0900  moxifloxacin tablet 400 mg      -- Oral Daily 02/28/17 0452    02/28/17 0600  metronidazole tablet 500 mg      -- Oral Every 8 hours 02/28/17 0452    02/28/17 0615  vancomycin 1 g in dextrose 5 % 250 mL IVPB (ready to mix system)  (Vancomycin IVPB with levels panel)      -- IV Every 12 hours (non-standard times) 02/28/17 0504        Antifungals     None        Antivirals     None             There is no immunization history on file for this  patient.    Family History     None        Social History     Social History    Marital status:      Spouse name: N/A    Number of children: N/A    Years of education: N/A     Social History Main Topics    Smoking status: Never Smoker    Smokeless tobacco: None    Alcohol use None    Drug use: None    Sexual activity: Not Asked     Other Topics Concern    None     Social History Narrative    None     Travel History:   Has patient traveled outside of the [place]?  Not Relevant  Has patient traveled outside of [place]? Not Relevant      Review of Systems   Unable to perform ROS: Dementia     Objective:     Vital Signs (Most Recent):  Temp: 98.2 °F (36.8 °C) (02/28/17 0305)  Pulse: 84 (02/28/17 0305)  Resp: 20 (02/28/17 0305)  BP: (!) 153/64 (02/28/17 0305)  SpO2: 95 % (02/28/17 0305) Vital Signs (24h Range):  Temp:  [97.5 °F (36.4 °C)-98.4 °F (36.9 °C)] 98.2 °F (36.8 °C)  Pulse:  [35-95] 84  Resp:  [15-26] 20  SpO2:  [90 %-100 %] 95 %  BP: (103-195)/() 153/64     Weight: 73.9 kg (162 lb 14.7 oz)  Body mass index is 31.82 kg/(m^2).    Estimated Creatinine Clearance: 28.7 mL/min (based on Cr of 1.1).    Physical Exam   Constitutional: She appears well-developed and well-nourished. No distress.   Elderly lady, AAOx1, pleasantly demented, NAD, has ch LE stasis changes   HENT:   Head: Normocephalic and atraumatic.   Mouth/Throat: Oropharynx is clear and moist.   Dry tongue   Eyes: Conjunctivae and EOM are normal. Pupils are equal, round, and reactive to light.   Neck: Normal range of motion. Neck supple. No JVD present.   Cardiovascular: Normal rate, regular rhythm and intact distal pulses.  Exam reveals no gallop and no friction rub.    Murmur heard.  Pulmonary/Chest: No respiratory distress. She has no wheezes. She has rales.   Obvious chest congestion upon coughing   Abdominal: Soft. Bowel sounds are normal. She exhibits no distension. There is no tenderness.   Genitourinary:    Genitourinary Comments: Deferred, howell in place   Musculoskeletal: She exhibits no tenderness or deformity.   + R knee scar, LE stasis changes B   Lymphadenopathy:     She has no cervical adenopathy.   Neurological: She is alert. She has normal reflexes.   Pleasantly disoriented, [place], speech clear   Skin: Skin is warm and dry. No rash noted. She is not diaphoretic.   Psychiatric: She has a normal mood and affect. Her behavior is normal.   Nursing note and vitals reviewed.      Significant Labs:   Blood Culture:   Recent Labs  Lab 02/26/17  1015 02/26/17  1030   LABBLOO Gram stain aer bottle: Gram positive cocci in clusters resembling Staph  Results called to and read back by:Ree White RN 02/27/2017    17:33 No Growth to date  No Growth to date     CBC:   Recent Labs  Lab 02/26/17  0958 02/27/17  0612   WBC 10.31 11.15   HGB 11.4* 12.4   HCT 34.3* 37.7    258     Respiratory Culture:   Recent Labs  Lab 02/26/17  1644   GSRESP >10 epithelial cells per low power field  Few WBC's  Few Gram negative rods  Few Gram positive rods  Many Gram positive cocci   RESPIRATORYC STAPHYLOCOCCUS AUREUSManySusceptibility pending     Urine Culture:   Recent Labs  Lab 02/26/17  1514   LABURIN ENTEROCOCCUS SPECIES> 100,000 cfu/mlIdentification and susceptibility pending       Significant Imaging: I have reviewed all pertinent imaging results/findings within the past 24 hours.

## 2017-03-01 LAB
ANION GAP SERPL CALC-SCNC: 8 MMOL/L
BACTERIA SPEC AEROBE CULT: NORMAL
BASOPHILS NFR BLD: 0 %
BUN SERPL-MCNC: 41 MG/DL
BURR CELLS BLD QL SMEAR: ABNORMAL
CALCIUM SERPL-MCNC: 8.4 MG/DL
CHLORIDE SERPL-SCNC: 111 MMOL/L
CO2 SERPL-SCNC: 19 MMOL/L
CREAT SERPL-MCNC: 0.9 MG/DL
DIFFERENTIAL METHOD: ABNORMAL
EOSINOPHIL NFR BLD: 0 %
ERYTHROCYTE [DISTWIDTH] IN BLOOD BY AUTOMATED COUNT: 13.8 %
EST. GFR  (AFRICAN AMERICAN): >60 ML/MIN/1.73 M^2
EST. GFR  (NON AFRICAN AMERICAN): 55 ML/MIN/1.73 M^2
GLUCOSE SERPL-MCNC: 109 MG/DL
GRAM STN SPEC: NORMAL
HCT VFR BLD AUTO: 38.2 %
HGB BLD-MCNC: 12.9 G/DL
LYMPHOCYTES NFR BLD: 9 %
MCH RBC QN AUTO: 30.9 PG
MCHC RBC AUTO-ENTMCNC: 33.8 %
MCV RBC AUTO: 92 FL
METAMYELOCYTES NFR BLD MANUAL: 2 %
MONOCYTES NFR BLD: 4 %
NEUTROPHILS NFR BLD: 85 %
OVALOCYTES BLD QL SMEAR: ABNORMAL
PLATELET # BLD AUTO: 341 K/UL
PLATELET BLD QL SMEAR: ABNORMAL
PMV BLD AUTO: 9.5 FL
POIKILOCYTOSIS BLD QL SMEAR: SLIGHT
POTASSIUM SERPL-SCNC: 4.2 MMOL/L
RBC # BLD AUTO: 4.17 M/UL
SODIUM SERPL-SCNC: 138 MMOL/L
VANCOMYCIN SERPL-MCNC: 11.3 UG/ML
WBC # BLD AUTO: 16.34 K/UL

## 2017-03-01 PROCEDURE — 63600175 PHARM REV CODE 636 W HCPCS: Performed by: FAMILY MEDICINE

## 2017-03-01 PROCEDURE — 85027 COMPLETE CBC AUTOMATED: CPT

## 2017-03-01 PROCEDURE — 80202 ASSAY OF VANCOMYCIN: CPT

## 2017-03-01 PROCEDURE — 94761 N-INVAS EAR/PLS OXIMETRY MLT: CPT

## 2017-03-01 PROCEDURE — 63600175 PHARM REV CODE 636 W HCPCS: Performed by: EMERGENCY MEDICINE

## 2017-03-01 PROCEDURE — 25000003 PHARM REV CODE 250: Performed by: NURSE PRACTITIONER

## 2017-03-01 PROCEDURE — 99900035 HC TECH TIME PER 15 MIN (STAT)

## 2017-03-01 PROCEDURE — 36415 COLL VENOUS BLD VENIPUNCTURE: CPT

## 2017-03-01 PROCEDURE — 25000242 PHARM REV CODE 250 ALT 637 W/ HCPCS: Performed by: NURSE PRACTITIONER

## 2017-03-01 PROCEDURE — 21400001 HC TELEMETRY ROOM

## 2017-03-01 PROCEDURE — 25000003 PHARM REV CODE 250: Performed by: EMERGENCY MEDICINE

## 2017-03-01 PROCEDURE — 80048 BASIC METABOLIC PNL TOTAL CA: CPT

## 2017-03-01 PROCEDURE — 25000003 PHARM REV CODE 250: Performed by: INTERNAL MEDICINE

## 2017-03-01 PROCEDURE — 94640 AIRWAY INHALATION TREATMENT: CPT

## 2017-03-01 PROCEDURE — 85007 BL SMEAR W/DIFF WBC COUNT: CPT

## 2017-03-01 PROCEDURE — 96372 THER/PROPH/DIAG INJ SC/IM: CPT

## 2017-03-01 PROCEDURE — 99222 1ST HOSP IP/OBS MODERATE 55: CPT | Mod: ,,, | Performed by: INTERNAL MEDICINE

## 2017-03-01 PROCEDURE — 27000221 HC OXYGEN, UP TO 24 HOURS

## 2017-03-01 RX ORDER — METOPROLOL TARTRATE 25 MG/1
12.5 TABLET ORAL 2 TIMES DAILY
Status: DISCONTINUED | OUTPATIENT
Start: 2017-03-01 | End: 2017-03-01

## 2017-03-01 RX ORDER — METOPROLOL TARTRATE 25 MG/1
12.5 TABLET ORAL 2 TIMES DAILY
Status: DISCONTINUED | OUTPATIENT
Start: 2017-03-01 | End: 2017-03-02

## 2017-03-01 RX ADMIN — IPRATROPIUM BROMIDE AND ALBUTEROL SULFATE 3 ML: .5; 3 SOLUTION RESPIRATORY (INHALATION) at 01:03

## 2017-03-01 RX ADMIN — HEPARIN SODIUM 5000 UNITS: 5000 INJECTION, SOLUTION INTRAVENOUS; SUBCUTANEOUS at 06:03

## 2017-03-01 RX ADMIN — HEPARIN SODIUM 5000 UNITS: 5000 INJECTION, SOLUTION INTRAVENOUS; SUBCUTANEOUS at 09:03

## 2017-03-01 RX ADMIN — METRONIDAZOLE 500 MG: 500 INJECTION, SOLUTION INTRAVENOUS at 02:03

## 2017-03-01 RX ADMIN — METRONIDAZOLE 500 MG: 500 INJECTION, SOLUTION INTRAVENOUS at 09:03

## 2017-03-01 RX ADMIN — ATORVASTATIN CALCIUM 20 MG: 10 TABLET, FILM COATED ORAL at 09:03

## 2017-03-01 RX ADMIN — Medication 12.5 MG: at 09:03

## 2017-03-01 RX ADMIN — Medication 12.5 MG: at 08:03

## 2017-03-01 RX ADMIN — LORAZEPAM 0.5 MG: 2 INJECTION, SOLUTION INTRAMUSCULAR; INTRAVENOUS at 02:03

## 2017-03-01 RX ADMIN — FAMOTIDINE 20 MG: 20 TABLET ORAL at 09:03

## 2017-03-01 RX ADMIN — MOXIFLOXACIN HYDROCHLORIDE 400 MG: 400 TABLET, FILM COATED ORAL at 09:03

## 2017-03-01 RX ADMIN — METRONIDAZOLE 500 MG: 500 INJECTION, SOLUTION INTRAVENOUS at 06:03

## 2017-03-01 RX ADMIN — HEPARIN SODIUM 5000 UNITS: 5000 INJECTION, SOLUTION INTRAVENOUS; SUBCUTANEOUS at 02:03

## 2017-03-01 RX ADMIN — Medication 1000 MG: at 10:03

## 2017-03-01 RX ADMIN — IPRATROPIUM BROMIDE AND ALBUTEROL SULFATE 3 ML: .5; 3 SOLUTION RESPIRATORY (INHALATION) at 07:03

## 2017-03-01 RX ADMIN — ASPIRIN 81 MG CHEWABLE TABLET 81 MG: 81 TABLET CHEWABLE at 09:03

## 2017-03-01 RX ADMIN — IPRATROPIUM BROMIDE AND ALBUTEROL SULFATE 3 ML: .5; 3 SOLUTION RESPIRATORY (INHALATION) at 08:03

## 2017-03-01 NOTE — PLAN OF CARE
Problem: Patient Care Overview  Goal: Plan of Care Review  Outcome: Ongoing (interventions implemented as appropriate)  Pt remained free of injury during shift, stable condition, pain adequately controlled, calm and sleeping during shift with having been administered antianxiety meds during day shift, received respiratory treatments from RT, receiving antibiotics, confused and disoriented to place/situation/time, family remained at bedside and bed alarm set, no acute distress, and will continue to monitor. 24hr chart review performed.

## 2017-03-01 NOTE — SUBJECTIVE & OBJECTIVE
Interval History: Spoke with DtJohanna zepeda, at bedside about condition and POC.  At this time they are still considering hospice, but they were not aware that she could not be treated with IV antibx if she was hospice.  They will discuss this as a family.  Patient has not been eating and drinking much which may be progression of her dementia vs infection    Review of Systems   Unable to perform ROS: Dementia     Objective:     Vital Signs (Most Recent):  Temp: 98 °F (36.7 °C) (03/01/17 0900)  Pulse: 72 (03/01/17 0900)  Resp: 20 (03/01/17 0900)  BP: 124/75 (03/01/17 0900)  SpO2: (!) 93 % (03/01/17 0900) Vital Signs (24h Range):  Temp:  [96.8 °F (36 °C)-98.4 °F (36.9 °C)] 98 °F (36.7 °C)  Pulse:  [] 72  Resp:  [16-28] 20  SpO2:  [93 %-97 %] 93 %  BP: (105-153)/(54-75) 124/75     Weight: 73.9 kg (162 lb 14.7 oz)  Body mass index is 31.82 kg/(m^2).    Intake/Output Summary (Last 24 hours) at 03/01/17 1041  Last data filed at 03/01/17 0615   Gross per 24 hour   Intake          1703.34 ml   Output              950 ml   Net           753.34 ml      Physical Exam   Constitutional:   Elderly, ill appearting   HENT:   Head: Normocephalic and atraumatic.   Eyes: Conjunctivae and EOM are normal. Pupils are equal, round, and reactive to light.   Neck: Neck supple.   Cardiovascular: Normal heart sounds.  Exam reveals no friction rub.    No murmur heard.  Irregularly irregular (130s-160s)   Pulmonary/Chest: Effort normal and breath sounds normal.   Abdominal: Soft. Bowel sounds are normal.   Musculoskeletal: She exhibits no edema or tenderness.   Neurological: She is alert.   Skin: Skin is warm and dry.       Significant Labs:   BMP:   Recent Labs  Lab 02/28/17  0636 03/01/17  0709   * 109   * 138   K 4.0 4.2    111*   CO2 21* 19*   BUN 41* 41*   CREATININE 0.9 0.9   CALCIUM 8.7 8.4*   MG 1.8  --      CBC:   Recent Labs  Lab 02/28/17  0636 03/01/17  0708   WBC 19.24* 16.34*   HGB 12.9 12.9   HCT 38.4 38.2     912       Significant Imaging: I have reviewed all pertinent imaging results/findings within the past 24 hours.

## 2017-03-01 NOTE — PHYSICIAN QUERY
PT Name: Amy Guzman  MR #: [id number]  Physician Query Form - Cause and Effect Relationship Clarification    Reviewer  Ext [phone] Layne Mcconnell RN CCDS    This form is a permanent document in the medical record.     Query Date: March 1, 2017  By submitting this query, we are merely seeking further clarification of documentation. Please utilize your independent clinical judgment when addressing the question(s) below.      Supporting Clinical Findings     Location in record                                                                       Respiratory Culture    MRSA                                                             Pneumonia                                                            Lab culture report                                                                          Urine Culture    E. Coli                                                           E. Coli UTI                                                           Lab culture report     Provider, please clarify if there is any correlation between ________MRSA_______ and ______Pneumonia____________.     Are the conditions:        [  ] Due to or associated with each other      [  ] Unrelated to each other      [  ] Other (Please Specify): _________________________      [  ] Clinically Undetermined      Provider, please clarify if there is any correlation between ________MRSA, ECOLI_______ and _____Sepsis____________.     Are the conditions:        [ x ] Due to or associated with each other      [  ] Unrelated to each other      [  ] Other (Please Specify): _________________________      [  ] Clinically Undetermined

## 2017-03-01 NOTE — PLAN OF CARE
03/01/17 1050   Discharge Reassessment   Assessment Type Discharge Planning Reassessment   Discussed in MD rounds. Discussion with family regarding hospice initiated by MD. Family has not made decision. Phoned Nsireen,  at [facility]. Provided update on patient status, including MRSA status and need for isolation. Discussed possible need for hospice. She requested that hospice at nursing home be entered on AVS, they will have a family meeting and set up the hospice at the nursing home.

## 2017-03-01 NOTE — PLAN OF CARE
Problem: Patient Care Overview  Goal: Plan of Care Review  Outcome: Ongoing (interventions implemented as appropriate)  Reviewed POC, including indications and possible side effects of administered medications. Patient verbalized understanding and teach back.      12 hour chart check complete.

## 2017-03-01 NOTE — PROGRESS NOTES
[facility] Hospital Medicine  Progress Note    Patient Name: Amy Guzman  MRN: [id number]  Patient Class: IP- Inpatient   Admission Date: 2/26/2017  Length of Stay: 2 days  Attending Physician: Wyatt Schilling MD  Primary Care Provider: Primary Doctor No        Subjective:     Principal Problem:Pneumonia of both lower lobes due to infectious organism    HPI:  Amy Guzman is a 89 y.o. female NH resident with a PMH of HTN, HLD, OA, GERD and Dementia who was brought to ER by EMS for progressive cough and SOB for 1 week. Per [facility], patient spO2 was 60% at the nursing home which increased to 77% on 4L O2. They report giving patient a DuoNeb treatment, Albuterol, and 125mg Solumedrol. Associated with dry cough and subjective fever. Pt denies any chills, fatigue, nausea, vomiting, CP, leg pain/swelling, and all other sx. No further complaints or concerns at this time.     Hospital Course:  In ED creatinine 1.6, PaO2 53, UA+ and CXR with bilat patchy infiltrates.  BP dropped to 88/35 in ED. Sepsis protocol started, given triple IV Abx and IVF vol resuscitation.  Admitted to ICU. No pressors needed. Initial urine cloudy and dirty but now urine clearing up. Pt did well, was transferred to floor. Her Blood and Urine Cx is growing GPC-- she is on Vanco/ Avelox. ID following. She also developed Afib with RVR treated with IV dig. She is also very restless, agitated today requiring IV haldol and Ativan. She is a DNR, family would like her to return to [facility] as Hosp[facility] now,            Interval History: Her Blood and Urine Cx is growing GPC-- she is on Vanco/ Avelox. ID following. She also developed Afib with RVR treated with IV dig. She is also very restless, agitated today requiring IV Haldol and Ativan.     Review of Systems   Unable to perform ROS: Patient nonverbal     Objective:     Vital Signs (Most Recent):  Temp: 97.8 °F (36.6 °C) (02/28/17 1506)  Pulse: 73 (02/28/17 1506)  Resp: 20 (02/28/17  1506)  BP: (!) 121/58 (02/28/17 1506)  SpO2: (!) 94 % (02/28/17 1506) Vital Signs (24h Range):  Temp:  [97.4 °F (36.3 °C)-98.4 °F (36.9 °C)] 97.8 °F (36.6 °C)  Pulse:  [] 73  Resp:  [18-28] 20  SpO2:  [84 %-97 %] 94 %  BP: (105-195)/() 121/58     Weight: 73.9 kg (162 lb 14.7 oz)  Body mass index is 31.82 kg/(m^2).    Intake/Output Summary (Last 24 hours) at 02/28/17 1856  Last data filed at 02/28/17 1803   Gross per 24 hour   Intake          3653.75 ml   Output              595 ml   Net          3058.75 ml      Physical Exam   Constitutional: She appears well-developed and well-nourished. No distress.   Elderly lady, AAOx1, pleasantly demented, NAD, has ch LE stasis changes   HENT:   Head: Normocephalic and atraumatic.   Mouth/Throat: Oropharynx is clear and moist.   Dry tongue   Eyes: Conjunctivae and EOM are normal. Pupils are equal, round, and reactive to light.   Neck: Normal range of motion. Neck supple. No JVD present.   Cardiovascular: Normal rate, regular rhythm and intact distal pulses.  Exam reveals no gallop and no friction rub.    Murmur heard.  Pulmonary/Chest: No respiratory distress. She has no wheezes. She has rales.   Obvious chest congestion upon coughing   Abdominal: Soft. Bowel sounds are normal. She exhibits no distension. There is no tenderness.   Genitourinary:   Genitourinary Comments: Deferred, howell in place   Musculoskeletal: She exhibits tenderness. She exhibits no deformity.   + R knee scar, LE stasis changes B   Lymphadenopathy:     She has no cervical adenopathy.   Neurological: She is alert. She has normal reflexes.   Pleasantly disoriented, [place], speech clear   Skin: Skin is warm and dry. Rash noted. She is not diaphoretic.   Psychiatric: She has a normal mood and affect. Her behavior is normal.   Nursing note and vitals reviewed.      Significant Labs:    Blood Culture:   BMP:   Recent Labs  Lab 02/28/17  0636   *   *   K 4.0      CO2 21*   BUN 41*    CREATININE 0.9   CALCIUM 8.7   MG 1.8     CBC:   Recent Labs  Lab 02/27/17  0612 02/28/17  0636   WBC 11.15 19.24*   HGB 12.4 12.9   HCT 37.7 38.4    333     All pertinent labs within the past 24 hours have been reviewed.    Significant Imaging: I have reviewed all pertinent imaging results/findings within the past 24 hours.    Assessment/Plan:      Septic shock, resolved as of 2/27/2017  Sec to Pneumonia and UTI  S/p Volume Resuscitation-- improved   May need pressors but she is DNR  Continue triple Abx      Acute respiratory failure with hypoxia, resolved as of 2/28/2017  Sec to Pneumonia  Improved-- now on NC 4 L  Continue nebs      * Pneumonia of both lower lobes due to infectious organism  B HAP as pt is NH resident  Doesn't appear Aspiration type  Continue Vanco and Avelox    ID consulted       Acute cystitis without hematuria  Continue IVF and IV Abx  Growing Enterococcus.     Vascular dementia without behavioral disturbance  Advance, NH resident, bed bound, needs total care.      Acute renal failure  Improving with IVF  Hardly any oral intake      UTI (urinary tract infection) due to Enterococcus  As above      VTE Risk Mitigation         Ordered     heparin (porcine) injection 5,000 Units  Every 8 hours     Route:  Subcutaneous        02/26/17 1505     Medium Risk of VTE  Once      02/26/17 1430     Place sequential compression device  Until discontinued      02/26/17 1430          Wyatt Schilling MD  Department of Hospital Medicine   [facility]

## 2017-03-01 NOTE — NURSING
Dr. Schilling notified of pt's increased HR in 120s-150s and agitation. Orders for Ativan for agitation. He states he will be here shortly to evaluate the pt. Will monitor.

## 2017-03-01 NOTE — ASSESSMENT & PLAN NOTE
B HAP as pt is NH resident  Doesn't appear Aspiration type  Continue Vanco and Avelox    ID consulted

## 2017-03-01 NOTE — PLAN OF CARE
Problem: Patient Care Overview  Goal: Plan of Care Review  Outcome: Ongoing (interventions implemented as appropriate)  Pt injury free this shift. Agitation noted. Haldol/Ativan given prn as ordered. Sinus tach/a-fib on the monitor. IVF infusing as ordered. NAD. Chart reviewed. Will continue to monitor.

## 2017-03-01 NOTE — SUBJECTIVE & OBJECTIVE
Interval History: Her Blood and Urine Cx is growing GPC-- she is on Vanco/ Avelox. ID following. She also developed Afib with RVR treated with IV dig. She is also very restless, agitated today requiring IV Haldol and Ativan.     Review of Systems   Unable to perform ROS: Patient nonverbal     Objective:     Vital Signs (Most Recent):  Temp: 97.8 °F (36.6 °C) (02/28/17 1506)  Pulse: 73 (02/28/17 1506)  Resp: 20 (02/28/17 1506)  BP: (!) 121/58 (02/28/17 1506)  SpO2: (!) 94 % (02/28/17 1506) Vital Signs (24h Range):  Temp:  [97.4 °F (36.3 °C)-98.4 °F (36.9 °C)] 97.8 °F (36.6 °C)  Pulse:  [] 73  Resp:  [18-28] 20  SpO2:  [84 %-97 %] 94 %  BP: (105-195)/() 121/58     Weight: 73.9 kg (162 lb 14.7 oz)  Body mass index is 31.82 kg/(m^2).    Intake/Output Summary (Last 24 hours) at 02/28/17 1856  Last data filed at 02/28/17 1803   Gross per 24 hour   Intake          3653.75 ml   Output              595 ml   Net          3058.75 ml      Physical Exam   Constitutional: She appears well-developed and well-nourished. No distress.   Elderly lady, AAOx1, pleasantly demented, NAD, has ch LE stasis changes   HENT:   Head: Normocephalic and atraumatic.   Mouth/Throat: Oropharynx is clear and moist.   Dry tongue   Eyes: Conjunctivae and EOM are normal. Pupils are equal, round, and reactive to light.   Neck: Normal range of motion. Neck supple. No JVD present.   Cardiovascular: Normal rate, regular rhythm and intact distal pulses.  Exam reveals no gallop and no friction rub.    Murmur heard.  Pulmonary/Chest: No respiratory distress. She has no wheezes. She has rales.   Obvious chest congestion upon coughing   Abdominal: Soft. Bowel sounds are normal. She exhibits no distension. There is no tenderness.   Genitourinary:   Genitourinary Comments: Deferred, howell in place   Musculoskeletal: She exhibits tenderness. She exhibits no deformity.   + R knee scar, LE stasis changes B   Lymphadenopathy:     She has no cervical  adenopathy.   Neurological: She is alert. She has normal reflexes.   Pleasantly disoriented, [place], speech clear   Skin: Skin is warm and dry. Rash noted. She is not diaphoretic.   Psychiatric: She has a normal mood and affect. Her behavior is normal.   Nursing note and vitals reviewed.      Significant Labs:    Blood Culture:   BMP:   Recent Labs  Lab 02/28/17  0636   *   *   K 4.0      CO2 21*   BUN 41*   CREATININE 0.9   CALCIUM 8.7   MG 1.8     CBC:   Recent Labs  Lab 02/27/17  0612 02/28/17  0636   WBC 11.15 19.24*   HGB 12.4 12.9   HCT 37.7 38.4    333     All pertinent labs within the past 24 hours have been reviewed.    Significant Imaging: I have reviewed all pertinent imaging results/findings within the past 24 hours.

## 2017-03-01 NOTE — PROGRESS NOTES
Met with daughter at bedside. Informed her that patient will be discharged to [facility]. She stated that she will call Janie Keenan to make arrangements to move her recliner. She verbalized being pleased with the move.

## 2017-03-01 NOTE — ASSESSMENT & PLAN NOTE
- MRSA positive in respiratory cultures  - On Vanc/flagyl  - Avelox dcd - discussed with Dr. Quinones  - SLP reviewed - no aspiration, recs: Pureed with thin liquids

## 2017-03-01 NOTE — PROGRESS NOTES
[facility] Hospital Medicine  Progress Note    Patient Name: Amy Guzman  MRN: [id number]  Patient Class: IP- Inpatient   Admission Date: 2/26/2017  Length of Stay: 3 days  Attending Physician: Vicky Zarate MD  Primary Care Provider: Primary Doctor No        Subjective:     Principal Problem:Pneumonia of both lower lobes due to infectious organism    HPI:  Amy Guzman is a 89 y.o. female NH resident with a PMH of HTN, HLD, OA, GERD and Dementia who was brought to ER by EMS for progressive cough and SOB for 1 week. Per [facility], patient spO2 was 60% at the nursing home which increased to 77% on 4L O2. They report giving patient a DuoNeb treatment, Albuterol, and 125mg Solumedrol. Associated with dry cough and subjective fever. Pt denies any chills, fatigue, nausea, vomiting, CP, leg pain/swelling, and all other sx. No further complaints or concerns at this time.     Hospital Course:  In ED creatinine 1.6, PaO2 53, UA+ and CXR with bilat patchy infiltrates.  BP dropped to 88/35 in ED. Sepsis protocol started, given triple IV Abx and IVF vol resuscitation.  Admitted to ICU. No pressors needed. Initial urine cloudy and dirty but now urine clearing up. Pt did well, was transferred to floor. Her Blood and Urine Cx is growing GPC-- she is on Vanco/ Avelox. ID following. She also developed Afib with RVR treated with IV dig. She is also very restless, agitated today requiring IV haldol and Ativan. She is a DNR, family would like her to return to [facility] as Hospice now,            Interval History: Spoke with DtrJohanna, at bedside about condition and POC.  At this time they are still considering hospice, but they were not aware that she could not be treated with IV antibx if she was hospice.  They will discuss this as a family.  Patient has not been eating and drinking much which may be progression of her dementia vs infection    Review of Systems   Unable to perform ROS: Dementia     Objective:      Vital Signs (Most Recent):  Temp: 98 °F (36.7 °C) (03/01/17 0900)  Pulse: 72 (03/01/17 0900)  Resp: 20 (03/01/17 0900)  BP: 124/75 (03/01/17 0900)  SpO2: (!) 93 % (03/01/17 0900) Vital Signs (24h Range):  Temp:  [96.8 °F (36 °C)-98.4 °F (36.9 °C)] 98 °F (36.7 °C)  Pulse:  [] 72  Resp:  [16-28] 20  SpO2:  [93 %-97 %] 93 %  BP: (105-153)/(54-75) 124/75     Weight: 73.9 kg (162 lb 14.7 oz)  Body mass index is 31.82 kg/(m^2).    Intake/Output Summary (Last 24 hours) at 03/01/17 1041  Last data filed at 03/01/17 0615   Gross per 24 hour   Intake          1703.34 ml   Output              950 ml   Net           753.34 ml      Physical Exam   Constitutional:   Elderly, ill appearting   HENT:   Head: Normocephalic and atraumatic.   Eyes: Conjunctivae and EOM are normal. Pupils are equal, round, and reactive to light.   Neck: Neck supple.   Cardiovascular: Normal heart sounds.  Exam reveals no friction rub.    No murmur heard.  Irregularly irregular (130s-160s)   Pulmonary/Chest: Effort normal and breath sounds normal.   Abdominal: Soft. Bowel sounds are normal.   Musculoskeletal: She exhibits no edema or tenderness.   Neurological: She is alert.   Skin: Skin is warm and dry.       Significant Labs:   BMP:   Recent Labs  Lab 02/28/17  0636 03/01/17  0709   * 109   * 138   K 4.0 4.2    111*   CO2 21* 19*   BUN 41* 41*   CREATININE 0.9 0.9   CALCIUM 8.7 8.4*   MG 1.8  --      CBC:   Recent Labs  Lab 02/28/17  0636 03/01/17  0708   WBC 19.24* 16.34*   HGB 12.9 12.9   HCT 38.4 38.2    341       Significant Imaging: I have reviewed all pertinent imaging results/findings within the past 24 hours.    Assessment/Plan:      * Pneumonia of both lower lobes due to infectious organism  - MRSA positive in respiratory cultures  - On Vanc/flagyl  - Avelox dcd - discussed with Dr. Quinones  - SLP reviewed - no aspiration, recs: Pureed with thin liquids      Acute cystitis without hematuria  - Enterococcus  positive  - Cont vanc  - ID on board    Vascular dementia without behavioral disturbance  Advance, NH resident, bed bound, needs total care.      Swallowing difficulty  No aspiration on Swallow study      Acute renal failure  - Improving with IVF  - pt encouraged to increase PO intake.      UTI (urinary tract infection) due to Enterococcus  As above      New onset a-fib  - cont metoprolol, ASA, Statin  - Cardiology consulted    VTE Risk Mitigation         Ordered     heparin (porcine) injection 5,000 Units  Every 8 hours     Route:  Subcutaneous        02/26/17 1505     Medium Risk of VTE  Once      02/26/17 1430     Place sequential compression device  Until discontinued      02/26/17 1430          Vicky Zarate MD  Department of Hospital Medicine   [facility]

## 2017-03-01 NOTE — CONSULTS
Consult Note  Cardiology    Consult Requested By: Dr Schilling  Reason for Consult: New onset A Fib    SUBJECTIVE:     History of Present Illness:  Ms Guzman is a 89 y.o. female presents with PMH of HTN, HLD, Dementia and GERD. She present to [facility] for Nursing Home with shortness of breath for one week. Associated symptoms include cough and subjective fever. Currently being treated for pneumonia,respiratory cultures are positive for staph aureus. Patient went into A Fib with RVR on yesterday. Today A Fib rate controled in the 90's. No signs of myocardial ischemia or ADHF. Blood pressure stable.     Review of patient's allergies indicates:   Allergen Reactions    Lidocaine      Hallucinations    Penicillins Rash    Sulfa (sulfonamide antibiotics) Rash       Past Medical History:   Diagnosis Date    Dementia     GERD (gastroesophageal reflux disease)     Hyperlipidemia     Hypertension     Localized edema     Osteoarthritis     Pneumonia     Rotator cuff tear     UTI (urinary tract infection)      Past Surgical History:   Procedure Laterality Date    esophogus surgery      FEMUR SURGERY      TOTAL KNEE ARTHROPLASTY       History reviewed. No pertinent family history.  Social History   Substance Use Topics    Smoking status: Never Smoker    Smokeless tobacco: None    Alcohol use None        Review of Systems:  Constitutional: positive for subjective fever or negative chills   Eyes: negative  Ears, nose, mouth, throat, and face: negative  Respiratory: positive  for shortness of breath, negative for orthopnea or PND  Cardiovascular: negative for chest pain, palpitations, syncope or near syncope  Gastrointestinal: negative for nausea or vomiting  Genitourinary:negative  Hematologic/lymphatic: negative  Musculoskeletal:negative,   Neurological: negative  Behavioral/Psych: negative  Endocrine: negative  Allergic/Immunologic: negative    OBJECTIVE:     Vital Signs (Most Recent)  Temp: 98.4  °F (36.9 °C) (03/01/17 0418)  Pulse: 93 (03/01/17 0726)  Resp: 20 (03/01/17 0726)  BP: (!) 136/54 (03/01/17 0418)  SpO2: (!) 94 % (03/01/17 0726)    Vital Signs Range (Last 24H):  Temp:  [96.8 °F (36 °C)-98.4 °F (36.9 °C)]   Pulse:  []   Resp:  [16-28]   BP: (105-153)/(54-84)   SpO2:  [93 %-97 %]     Current Facility-Administered Medications   Medication    0.9%  NaCl infusion    acetaminophen tablet 650 mg    albuterol-ipratropium 2.5mg-0.5mg/3mL nebulizer solution 3 mL    aspirin chewable tablet 81 mg    atorvastatin tablet 20 mg    bisacodyl suppository 10 mg    famotidine tablet 20 mg    haloperidol lactate injection 2 mg    heparin (porcine) injection 5,000 Units    lorazepam injection 0.5 mg    metronidazole IVPB 500 mg    moxifloxacin tablet 400 mg    ondansetron injection 4 mg    vancomycin 1 g in dextrose 5 % 250 mL IVPB (ready to mix system)     No current facility-administered medications on file prior to encounter.      No current outpatient prescriptions on file prior to encounter.       Physical Exam:  General appearance: lethergic, appears stated age and cooperative  Head: Normocephalic, without obvious abnormality, atraumatic  Eyes:  conjunctivae/corneas clear. PERRL, EOM's intact. Fundi benign.  Nose: no discharge  Throat: normal findings: tongue midline and normal  Neck: no adenopathy, no carotid bruit, no JVD, supple, symmetrical, trachea midline and thyroid not enlarged, symmetric, no tenderness/mass/nodules  Back:  no skin lesions, erythema, or scars  Lungs:  clear to auscultation bilaterally  Chest wall: no tenderness  Heart: irregular rate and rhythm, S1, S2 normal, no murmur, click, rub or gallop  Abdomen: soft, non-tender; bowel sounds normal; no masses,  no organomegaly  Extremities: extremities normal, atraumatic, no cyanosis or edema  Pulses: 1+ and symmetric  Neuro: Demented        Laboratory:  Chemistry:   Lab Results   Component Value Date     03/01/2017    K  4.2 03/01/2017     (H) 03/01/2017    CO2 19 (L) 03/01/2017    BUN 41 (H) 03/01/2017    CREATININE 0.9 03/01/2017    CALCIUM 8.4 (L) 03/01/2017     Cardiac Markers:   Lab Results   Component Value Date    TROPONINI 0.037 (H) 02/26/2017     Cardiac Markers (Last 3):   Lab Results   Component Value Date    TROPONINI 0.037 (H) 02/26/2017     CBC:   Lab Results   Component Value Date    WBC 16.34 (H) 03/01/2017    HGB 12.9 03/01/2017    HCT 38.2 03/01/2017    MCV 92 03/01/2017     03/01/2017     Lipids: No results found for: CHOL, TRIG, HDL, LDLDIRECT  Coagulation: No results found for: INR, APTT    Diagnostic Results:  ECG: Reviewed  X-Ray: Reviewed  US: Reviewed  CT: Reviewed  Echo: Reviewed      ASSESSMENT/PLAN:     Patient Active Problem List   Diagnosis    Pneumonia of both lower lobes due to infectious organism    Acute cystitis without hematuria    Vascular dementia without behavioral disturbance    Swallowing difficulty    Acute renal failure    UTI (urinary tract infection) due to Enterococcus    New onset a-fib        Patient with new onset A Fib, may have been triggered by infectious process. A Fib rate currently well controled in the 90's. No sign of myocardial ischemia or ADHF. Blood pressure and labs stable.       Plan:     Will continue current medications and treatment plan  Start Lopressor 12.5 mg BID, hold for SBP < 90  Continue Statin and ASA  Continue treating pneumonia and UTI      Chart reviewed. Dr. Escobar agrees with plan as outlined above.

## 2017-03-01 NOTE — PROGRESS NOTES
Clinical Pharmacy Progress Note: Vancomycin      Labs:      Estimated CrCl: 35.1 mL/min   WBC: 16.34 (decrease from 19.24 yesterday)  SCr: 0.9 (decrease from 1.6 three days ago)      Cultures:     Culture, Respiratory with Gram Stain [[id number]]  Collected: 02/26/17 1644       Order Status: Completed Specimen: Respiratory from Endotracheal Aspirate Updated: 02/28/17 1244        Respiratory Culture --        METHICILLIN RESISTANT STAPHYLOCOCCUS AUREUS   Many           Gram Stain (Respiratory) >10 epithelial cells per low power field        Gram Stain (Respiratory) Few WBC's        Gram Stain (Respiratory) Few Gram negative rods        Gram Stain (Respiratory) Few Gram positive rods        Gram Stain (Respiratory) Many Gram positive cocci         Susceptibility         Methicillin resistant staphylococcus aureus         CULTURE, RESPIRATORY (Preliminary)         Clindamycin >4  Resistant         Erythromycin >4  Resistant         Oxacillin >2  Resistant         Penicillin 8  Resistant         Tetracycline <=4  Sensitive         Trimeth/Sulfa <=0.5/9.5  Sensitive         Vancomycin 2  Sensitive                               Urine culture [[id number]]  Collected: 02/26/17 1514       Order Status: Completed Specimen: Urine from Urine, Catheterized Updated: 02/28/17 2208        Urine Culture, Routine --        ENTEROCOCCUS FAECALIS   > 100,000 cfu/ml            Susceptibility         Enterococcus faecalis         CULTURE, URINE         Ampicillin <=2  Sensitive         Ciprofloxacin 2  Intermediate         Nitrofurantoin <=32  Sensitive         Tetracycline >8  Resistant         Vancomycin 2  Sensitive                 Vitals:   Tmax/24h: 98.4   RR: 16-28  HR:      Level:   Vancomycin, random [[id number]] Collected: 03/01/17 0709        Specimen: Blood from Blood Updated: 03/01/17 0837        Vancomycin, Random 11.3 ug/mL       Dx: Pneumonia  Therapeutic trough goal = 15-20 mcg/mL     Plan: Pharmacy will continue  current vancomycin pulse dosing of 1000 mg IV with a placeholder dose entered for every 36 hours. Patient will receive a dose this morning.      Next level due: Random vancomycin level due 03/02/17 @ 0430 with AM labs.      Thank you for allowing us to participate in this patient's care.      Amalia Gottlieb, PharmD 3/1/2017 8:42 AM

## 2017-03-02 PROBLEM — N17.9 ACUTE RENAL FAILURE: Status: RESOLVED | Noted: 2017-02-28 | Resolved: 2017-03-02

## 2017-03-02 PROBLEM — I47.20 V-TACH: Status: ACTIVE | Noted: 2017-03-02

## 2017-03-02 PROBLEM — R78.81 BACTEREMIA: Status: ACTIVE | Noted: 2017-03-02

## 2017-03-02 LAB
ANION GAP SERPL CALC-SCNC: 8 MMOL/L
BASOPHILS # BLD AUTO: ABNORMAL K/UL
BASOPHILS NFR BLD: 0 %
BUN SERPL-MCNC: 25 MG/DL
BURR CELLS BLD QL SMEAR: ABNORMAL
CALCIUM SERPL-MCNC: 8.4 MG/DL
CHLORIDE SERPL-SCNC: 116 MMOL/L
CO2 SERPL-SCNC: 21 MMOL/L
CREAT SERPL-MCNC: 0.8 MG/DL
DIFFERENTIAL METHOD: ABNORMAL
EOSINOPHIL # BLD AUTO: ABNORMAL K/UL
EOSINOPHIL NFR BLD: 2 %
ERYTHROCYTE [DISTWIDTH] IN BLOOD BY AUTOMATED COUNT: 13.9 %
EST. GFR  (AFRICAN AMERICAN): >60 ML/MIN/1.73 M^2
EST. GFR  (NON AFRICAN AMERICAN): >60 ML/MIN/1.73 M^2
GLUCOSE SERPL-MCNC: 126 MG/DL
HCT VFR BLD AUTO: 39.2 %
HGB BLD-MCNC: 12.9 G/DL
LYMPHOCYTES # BLD AUTO: ABNORMAL K/UL
LYMPHOCYTES NFR BLD: 12 %
MAGNESIUM SERPL-MCNC: 1.8 MG/DL
MCH RBC QN AUTO: 30.4 PG
MCHC RBC AUTO-ENTMCNC: 32.9 %
MCV RBC AUTO: 92 FL
METAMYELOCYTES NFR BLD MANUAL: 1 %
MONOCYTES # BLD AUTO: ABNORMAL K/UL
MONOCYTES NFR BLD: 7 %
NEUTROPHILS NFR BLD: 71 %
NEUTS BAND NFR BLD MANUAL: 7 %
OVALOCYTES BLD QL SMEAR: ABNORMAL
PHOSPHATE SERPL-MCNC: 2.8 MG/DL
PLATELET # BLD AUTO: 290 K/UL
PLATELET BLD QL SMEAR: ABNORMAL
PMV BLD AUTO: 9.5 FL
POIKILOCYTOSIS BLD QL SMEAR: SLIGHT
POTASSIUM SERPL-SCNC: 4 MMOL/L
RBC # BLD AUTO: 4.25 M/UL
SODIUM SERPL-SCNC: 145 MMOL/L
VANCOMYCIN SERPL-MCNC: 15.9 UG/ML
WBC # BLD AUTO: 13.95 K/UL

## 2017-03-02 PROCEDURE — 25000003 PHARM REV CODE 250: Performed by: EMERGENCY MEDICINE

## 2017-03-02 PROCEDURE — 94640 AIRWAY INHALATION TREATMENT: CPT

## 2017-03-02 PROCEDURE — 25000242 PHARM REV CODE 250 ALT 637 W/ HCPCS: Performed by: NURSE PRACTITIONER

## 2017-03-02 PROCEDURE — 85007 BL SMEAR W/DIFF WBC COUNT: CPT

## 2017-03-02 PROCEDURE — 63600175 PHARM REV CODE 636 W HCPCS: Performed by: FAMILY MEDICINE

## 2017-03-02 PROCEDURE — 84100 ASSAY OF PHOSPHORUS: CPT

## 2017-03-02 PROCEDURE — 27000221 HC OXYGEN, UP TO 24 HOURS

## 2017-03-02 PROCEDURE — 85027 COMPLETE CBC AUTOMATED: CPT

## 2017-03-02 PROCEDURE — 80048 BASIC METABOLIC PNL TOTAL CA: CPT

## 2017-03-02 PROCEDURE — 99232 SBSQ HOSP IP/OBS MODERATE 35: CPT | Mod: ,,, | Performed by: INTERNAL MEDICINE

## 2017-03-02 PROCEDURE — 94761 N-INVAS EAR/PLS OXIMETRY MLT: CPT

## 2017-03-02 PROCEDURE — 21400001 HC TELEMETRY ROOM

## 2017-03-02 PROCEDURE — 25000003 PHARM REV CODE 250: Performed by: NURSE PRACTITIONER

## 2017-03-02 PROCEDURE — 25000003 PHARM REV CODE 250: Performed by: INTERNAL MEDICINE

## 2017-03-02 PROCEDURE — 96372 THER/PROPH/DIAG INJ SC/IM: CPT

## 2017-03-02 PROCEDURE — 36415 COLL VENOUS BLD VENIPUNCTURE: CPT

## 2017-03-02 PROCEDURE — 25000003 PHARM REV CODE 250: Performed by: FAMILY MEDICINE

## 2017-03-02 PROCEDURE — 99900035 HC TECH TIME PER 15 MIN (STAT)

## 2017-03-02 PROCEDURE — 80202 ASSAY OF VANCOMYCIN: CPT

## 2017-03-02 PROCEDURE — 83735 ASSAY OF MAGNESIUM: CPT

## 2017-03-02 RX ORDER — METOPROLOL SUCCINATE 25 MG/1
25 TABLET, EXTENDED RELEASE ORAL DAILY
Status: DISCONTINUED | OUTPATIENT
Start: 2017-03-03 | End: 2017-03-06 | Stop reason: HOSPADM

## 2017-03-02 RX ORDER — LORAZEPAM 2 MG/ML
0.5 INJECTION INTRAMUSCULAR EVERY 6 HOURS PRN
Status: DISCONTINUED | OUTPATIENT
Start: 2017-03-02 | End: 2017-03-06 | Stop reason: HOSPADM

## 2017-03-02 RX ORDER — METOPROLOL TARTRATE 25 MG/1
25 TABLET, FILM COATED ORAL 2 TIMES DAILY
Status: DISCONTINUED | OUTPATIENT
Start: 2017-03-02 | End: 2017-03-02

## 2017-03-02 RX ORDER — METOPROLOL TARTRATE 1 MG/ML
5 INJECTION, SOLUTION INTRAVENOUS ONCE
Status: COMPLETED | OUTPATIENT
Start: 2017-03-02 | End: 2017-03-02

## 2017-03-02 RX ORDER — HALOPERIDOL 5 MG/ML
2 INJECTION INTRAMUSCULAR EVERY 4 HOURS PRN
Status: DISCONTINUED | OUTPATIENT
Start: 2017-03-02 | End: 2017-03-06 | Stop reason: HOSPADM

## 2017-03-02 RX ADMIN — IPRATROPIUM BROMIDE AND ALBUTEROL SULFATE 3 ML: .5; 3 SOLUTION RESPIRATORY (INHALATION) at 07:03

## 2017-03-02 RX ADMIN — HEPARIN SODIUM 5000 UNITS: 5000 INJECTION, SOLUTION INTRAVENOUS; SUBCUTANEOUS at 02:03

## 2017-03-02 RX ADMIN — METRONIDAZOLE 500 MG: 500 INJECTION, SOLUTION INTRAVENOUS at 10:03

## 2017-03-02 RX ADMIN — HEPARIN SODIUM 5000 UNITS: 5000 INJECTION, SOLUTION INTRAVENOUS; SUBCUTANEOUS at 10:03

## 2017-03-02 RX ADMIN — AMIODARONE HYDROCHLORIDE 1 MG/MIN: 1.8 INJECTION, SOLUTION INTRAVENOUS at 06:03

## 2017-03-02 RX ADMIN — METRONIDAZOLE 500 MG: 500 INJECTION, SOLUTION INTRAVENOUS at 02:03

## 2017-03-02 RX ADMIN — HEPARIN SODIUM 5000 UNITS: 5000 INJECTION, SOLUTION INTRAVENOUS; SUBCUTANEOUS at 05:03

## 2017-03-02 RX ADMIN — AMIODARONE HYDROCHLORIDE 150 MG: 1.5 INJECTION, SOLUTION INTRAVENOUS at 06:03

## 2017-03-02 RX ADMIN — METRONIDAZOLE 500 MG: 500 INJECTION, SOLUTION INTRAVENOUS at 05:03

## 2017-03-02 RX ADMIN — METOPROLOL TARTRATE 25 MG: 25 TABLET ORAL at 03:03

## 2017-03-02 RX ADMIN — IPRATROPIUM BROMIDE AND ALBUTEROL SULFATE 3 ML: .5; 3 SOLUTION RESPIRATORY (INHALATION) at 09:03

## 2017-03-02 RX ADMIN — FAMOTIDINE 20 MG: 20 TABLET ORAL at 08:03

## 2017-03-02 RX ADMIN — VANCOMYCIN HYDROCHLORIDE 1000 MG: 1 INJECTION, POWDER, LYOPHILIZED, FOR SOLUTION INTRAVENOUS at 10:03

## 2017-03-02 RX ADMIN — IPRATROPIUM BROMIDE AND ALBUTEROL SULFATE 3 ML: .5; 3 SOLUTION RESPIRATORY (INHALATION) at 03:03

## 2017-03-02 RX ADMIN — ASPIRIN 81 MG CHEWABLE TABLET 81 MG: 81 TABLET CHEWABLE at 08:03

## 2017-03-02 RX ADMIN — ATORVASTATIN CALCIUM 20 MG: 10 TABLET, FILM COATED ORAL at 08:03

## 2017-03-02 RX ADMIN — METOPROLOL TARTRATE 5 MG: 5 INJECTION INTRAVENOUS at 05:03

## 2017-03-02 NOTE — PROGRESS NOTES
[facility]  Infectious Disease  Progress Note    Patient Name: Amy Guzman  MRN: [id number]  Admission Date: 2/26/2017  Length of Stay: 4 days  Attending Physician: Vicky Zarate MD  Primary Care Provider: Primary Doctor No    Isolation Status: Contact  Assessment/Plan:      * Pneumonia of both lower lobes due to infectious organism  Respiratory cultures are positive for  MRstaph aureus   Blood cultures are positive for staph -  Will continue vancomycin     Acute cystitis without hematuria  Urine culture is positive for enterococcus.Continue vancomycin     New onset a-fib  Rate control as per primary team/cardiology .      Bacteremia  Will repeat blood culture-continue vanco      Anticipated Disposition:     Thank you for your consult. I will follow-up with patient. Please contact us if you have any additional questions.    Harinder Quinones MD  Infectious Disease  [facility]    Subjective:     Principal Problem:Pneumonia of both lower lobes due to infectious organism    Interval History: 89 year old woman with pneumonia /sepsis.  Urine culture is now positive for enterococcus.  Resp culture is positive for MRSA.  Blood culture is positive for staph capitis.    HPI:  89 year old woman - NH resident with history of dementia, HTN, HLD, OA, GERD and Dementia who was brought to ER by EMS for progressive cough and SOB for 1 week. Per [facility], patient spO2 was 60% at the nursing home which increased to 77% on 4L O2. She also now has new onset A fib.  Since admission, chest x-ray done on 02/26 showed mildly worsening left lower lobe infiltrate.  Stable right lung infiltrates.  CBC showed wbc of 19.24.  Blood culture is positive for staph capitis, respiratory culture-MRSA,urine culture -enterococcus     She had bedside swallow evaluation and po diet of puree and thin liquid was recommended.  She was seen and examined at bedside.  History taken from electronic chart.        Review of Systems    Unable to perform ROS: Dementia     Objective:     Vital Signs (Most Recent):  Temp: 97.8 °F (36.6 °C) (03/01/17 1936)  Pulse: (!) 145 (03/02/17 0329)  Resp: 18 (03/01/17 2006)  BP: 130/77 (03/01/17 1936)  SpO2: 96 % (03/01/17 2006) Vital Signs (24h Range):  Temp:  [97.3 °F (36.3 °C)-98.4 °F (36.9 °C)] 97.8 °F (36.6 °C)  Pulse:  [] 145  Resp:  [16-22] 18  SpO2:  [93 %-97 %] 96 %  BP: (102-136)/(54-77) 130/77     Weight: 73.9 kg (162 lb 14.7 oz)  Body mass index is 31.82 kg/(m^2).    Estimated Creatinine Clearance: 35.1 mL/min (based on Cr of 0.9).    Physical Exam   Constitutional: She appears well-developed and well-nourished. No distress.   Elderly lady, AAOx1, pleasantly demented, NAD, has ch LE stasis changes   HENT:   Head: Normocephalic and atraumatic.   Mouth/Throat: Oropharynx is clear and moist.   Dry tongue   Eyes: Conjunctivae and EOM are normal. Pupils are equal, round, and reactive to light.   Neck: Normal range of motion. Neck supple. No JVD present.   Cardiovascular: Intact distal pulses.  Exam reveals no gallop and no friction rub.    Murmur heard.  Irregular rhythm ,tachycardia   Pulmonary/Chest: No respiratory distress. She has no wheezes. She has rales.   Obvious chest congestion upon coughing   Abdominal: Soft. Bowel sounds are normal. She exhibits no distension. There is no tenderness.   Genitourinary:   Genitourinary Comments: Deferred, howell in place   Musculoskeletal: She exhibits no tenderness or deformity.   + R knee scar, LE stasis changes B   Lymphadenopathy:     She has no cervical adenopathy.   Neurological: She is alert. She has normal reflexes.   Pleasantly disoriented, [place], speech clear   Skin: Skin is warm and dry. No rash noted. She is not diaphoretic.   Psychiatric: She has a normal mood and affect. Her behavior is normal.   Nursing note and vitals reviewed.      Significant Labs:   Blood Culture:   Recent Labs  Lab 02/26/17  1015 02/26/17  1030 02/28/17  0636  02/28/17  0643   LABBLOO Gram stain aer bottle: Gram positive cocci in clusters resembling Staph  Results called to and read back by:Ree White RN 02/27/2017    17:33  STAPHYLOCOCCUS CAPITISSusceptibility pending Gram stain aer bottle: Gram positive cocci in clusters resembling Staph   Results called to and read back by:João Russell RN 02/28/2017    12:24  Gram stain keith bottle: Gram positive cocci in clusters resembling Staph   02/28/2017  20:43 No Growth to date  No Growth to date No Growth to date  No Growth to date     CBC:   Recent Labs  Lab 02/28/17  0636 03/01/17  0708   WBC 19.24* 16.34*   HGB 12.9 12.9   HCT 38.4 38.2    341     CMP:   Recent Labs  Lab 02/28/17  0636 03/01/17  0709   * 138   K 4.0 4.2    111*   CO2 21* 19*   * 109   BUN 41* 41*   CREATININE 0.9 0.9   CALCIUM 8.7 8.4*   ANIONGAP 7* 8   EGFRNONAA 55* 55*     Respiratory Culture:   Recent Labs  Lab 02/26/17  1644   GSRESP >10 epithelial cells per low power field  Few WBC's  Few Gram negative rods  Few Gram positive rods  Many Gram positive cocci   RESPIRATORYC METHICILLIN RESISTANT STAPHYLOCOCCUS AUREUSMany       Significant Imaging: I have reviewed all pertinent imaging results/findings within the past 24 hours.

## 2017-03-02 NOTE — NURSING
Pt lung sound coarse, diminished. Ineffective coughing w/ thick secretions noted. Pt suctioned as needed.

## 2017-03-02 NOTE — PROGRESS NOTES
Cardiology Progress Note        SUBJECTIVE:   Remains in A-fib rte 110bpm.  Still on IV abx for pneumonia. Is on contact precautions for +MRSA. BP BP is stable. Afebrile. Not ideal candidate for OAC. On ASA only . No CNS complaints to suggest TIA or CVA. Has no symptoms to suggest CHF. No chest pain or angina. Swallow study performed and recommendations made for puree and thin liquids.     OBJECTIVE:     Vital Signs (Most Recent)  Temp: 97.4 °F (36.3 °C) (03/02/17 1100)  Pulse: 88 (03/02/17 1100)  Resp: 19 (03/02/17 1100)  BP: (!) 129/54 (03/02/17 1100)  SpO2: 96 % (03/02/17 1100)    Vital Signs Range (Last 24H):  Temp:  [97.3 °F (36.3 °C)-97.8 °F (36.6 °C)]   Pulse:  []   Resp:  [17-22]   BP: ()/(54-83)   SpO2:  [93 %-97 %]     Intake/Output last 3 shifts:  I/O last 3 completed shifts:  In: 2237.1 [I.V.:1587.1; IV Piggyback:650]  Out: 700 [Urine:700]    Intake/Output this shift:  I/O this shift:  In: 1003.3 [P.O.:240; I.V.:413.3; IV Piggyback:350]  Out: -     Review of patient's allergies indicates:   Allergen Reactions    Lidocaine      Hallucinations    Penicillins Rash    Sulfa (sulfonamide antibiotics) Rash       Current Facility-Administered Medications   Medication    0.9%  NaCl infusion    acetaminophen tablet 650 mg    albuterol-ipratropium 2.5mg-0.5mg/3mL nebulizer solution 3 mL    aspirin chewable tablet 81 mg    atorvastatin tablet 20 mg    bisacodyl suppository 10 mg    famotidine tablet 20 mg    haloperidol lactate injection 2 mg    heparin (porcine) injection 5,000 Units    lorazepam injection 0.5 mg    [START ON 3/3/2017] metoprolol succinate (TOPROL-XL) 24 hr tablet 25 mg    metronidazole IVPB 500 mg    ondansetron injection 4 mg    vancomycin 1 g in dextrose 5 % 250 mL IVPB (ready to mix system)     No current facility-administered medications on file prior to encounter.      No current outpatient prescriptions on file prior to encounter.       Physical Exam:  General  appearance: alert, appears stated age and cooperative  Head: Normocephalic, without obvious abnormality, atraumatic  Neck: no adenopathy, no carotid bruit, no JVD  Lungs: course to auscultation bilaterally  Chest wall: no tenderness  Heart: irregular tachy  rate and rhythm, S1, S2 normal, no murmur, click, rub or gallop  Abdomen: soft, non-tender; bowel sounds normal; no masses,  no organomegaly  Extremities: extremities normal, atraumatic, no cyanosis or edema  Pulses: 2+ and symmetric  Skin: Skin color, texture, turgor normal. No rashes or lesions  Neurologic: Grossly normal    Laboratory:  Chemistry:   Lab Results   Component Value Date     03/01/2017    K 4.2 03/01/2017     (H) 03/01/2017    CO2 19 (L) 03/01/2017    BUN 41 (H) 03/01/2017    CREATININE 0.9 03/01/2017    CALCIUM 8.4 (L) 03/01/2017     Cardiac Markers:   Lab Results   Component Value Date    TROPONINI 0.037 (H) 02/26/2017     Cardiac Markers (Last 3):   Lab Results   Component Value Date    TROPONINI 0.037 (H) 02/26/2017     CBC:   Lab Results   Component Value Date    WBC 13.95 (H) 03/02/2017    HGB 12.9 03/02/2017    HCT 39.2 03/02/2017    MCV 92 03/02/2017     03/02/2017     Lipids: No results found for: CHOL, TRIG, HDL, LDLDIRECT  Coagulation: No results found for: INR, APTT    Diagnostic Results:  ECG: Reviewed  X-Ray: Reviewed    ASSESSMENT/PLAN:     Patient Active Problem List   Diagnosis    Pneumonia of both lower lobes due to infectious organism    Acute cystitis without hematuria    Vascular dementia without behavioral disturbance    Swallowing difficulty    Acute renal failure    UTI (urinary tract infection) due to Enterococcus    New onset a-fib    Bacteremia        Plan:   Will switch to Toprol 25mg daily. Continue ASA 81mg daily for CVA prophylaxis. Not ideal candidate for OAC. Continue to treat pneumonia. Will follow PRN    Chart reviewed. Patient examined by Dr. Escobar and agrees with plan that has been  outlined.   .

## 2017-03-02 NOTE — PROGRESS NOTES
Run of V-tach 140's-150's 20 secs, on and off noted on monitor, contacted Dr. Zarate, stat lab order.

## 2017-03-02 NOTE — PLAN OF CARE
Problem: Patient Care Overview  Goal: Plan of Care Review  Outcome: Ongoing (interventions implemented as appropriate)  Fall precautions maintained, pt free from injuries/fall, pt repositioned q 2, hob elevated during feeding/med pass, legs elevated. Nonverbal indicators of pain absent, pt also denies pain. Disoriented x 4, does respond by voice. Lung sound coarse at times, O2 sat above 95% on 3L, pt swallows w/ pureed diet w/ encouragement, meds crushed w/ applesauce. No skin break down noted on buttocks and pressure point areas. A fib on monitor in the 90's.  IVF and abx given as prescribed. POC and meds reviewed w/ family, family verbalizes understanding, currently at bedside. Chart check done. Will cont to monitor.

## 2017-03-02 NOTE — ASSESSMENT & PLAN NOTE
Respiratory cultures are positive for  MRstaph aureus   Blood cultures are positive for staph -  Will continue vancomycin

## 2017-03-02 NOTE — PLAN OF CARE
Problem: Patient Care Overview  Goal: Plan of Care Review  Outcome: Ongoing (interventions implemented as appropriate)  Fall precautions. No c/o pain. No c/o nausea.

## 2017-03-02 NOTE — PROGRESS NOTES
Received phone call from [facility]. Spoke with daughter. Informed both that dc is not anticipated for today. Per both, patient will return to [facility] at discharge.

## 2017-03-02 NOTE — PLAN OF CARE
Problem: Patient Care Overview  Goal: Plan of Care Review  Outcome: Ongoing (interventions implemented as appropriate)  .

## 2017-03-02 NOTE — SUBJECTIVE & OBJECTIVE
Interval History: 89 year old woman with pneumonia /sepsis.  Urine culture is now positive for enterococcus.  Resp culture is positive for MRSA.  Blood culture is positive for staph capitis.    HPI:  89 year old woman - NH resident with history of dementia, HTN, HLD, OA, GERD and Dementia who was brought to ER by EMS for progressive cough and SOB for 1 week. Per [facility], patient spO2 was 60% at the nursing home which increased to 77% on 4L O2. She also now has new onset A fib.  Since admission, chest x-ray done on 02/26 showed mildly worsening left lower lobe infiltrate.  Stable right lung infiltrates.  CBC showed wbc of 19.24.  Blood culture is positive for staph capitis, respiratory culture-MRSA,urine culture -enterococcus     She had bedside swallow evaluation and po diet of puree and thin liquid was recommended.  She was seen and examined at bedside.  History taken from electronic chart.        Review of Systems   Unable to perform ROS: Dementia     Objective:     Vital Signs (Most Recent):  Temp: 97.8 °F (36.6 °C) (03/01/17 1936)  Pulse: (!) 145 (03/02/17 0329)  Resp: 18 (03/01/17 2006)  BP: 130/77 (03/01/17 1936)  SpO2: 96 % (03/01/17 2006) Vital Signs (24h Range):  Temp:  [97.3 °F (36.3 °C)-98.4 °F (36.9 °C)] 97.8 °F (36.6 °C)  Pulse:  [] 145  Resp:  [16-22] 18  SpO2:  [93 %-97 %] 96 %  BP: (102-136)/(54-77) 130/77     Weight: 73.9 kg (162 lb 14.7 oz)  Body mass index is 31.82 kg/(m^2).    Estimated Creatinine Clearance: 35.1 mL/min (based on Cr of 0.9).    Physical Exam   Constitutional: She appears well-developed and well-nourished. No distress.   Elderly lady, AAOx1, pleasantly demented, NAD, has ch LE stasis changes   HENT:   Head: Normocephalic and atraumatic.   Mouth/Throat: Oropharynx is clear and moist.   Dry tongue   Eyes: Conjunctivae and EOM are normal. Pupils are equal, round, and reactive to light.   Neck: Normal range of motion. Neck supple. No JVD present.   Cardiovascular: Intact distal  pulses.  Exam reveals no gallop and no friction rub.    Murmur heard.  Irregular rhythm ,tachycardia   Pulmonary/Chest: No respiratory distress. She has no wheezes. She has rales.   Obvious chest congestion upon coughing   Abdominal: Soft. Bowel sounds are normal. She exhibits no distension. There is no tenderness.   Genitourinary:   Genitourinary Comments: Deferred, howell in place   Musculoskeletal: She exhibits no tenderness or deformity.   + R knee scar, LE stasis changes B   Lymphadenopathy:     She has no cervical adenopathy.   Neurological: She is alert. She has normal reflexes.   Pleasantly disoriented, [place], speech clear   Skin: Skin is warm and dry. No rash noted. She is not diaphoretic.   Psychiatric: She has a normal mood and affect. Her behavior is normal.   Nursing note and vitals reviewed.      Significant Labs:   Blood Culture:   Recent Labs  Lab 02/26/17  1015 02/26/17  1030 02/28/17  0636 02/28/17  0643   LABBLOO Gram stain aer bottle: Gram positive cocci in clusters resembling Staph  Results called to and read back by:Ree White RN 02/27/2017    17:33  STAPHYLOCOCCUS CAPITISSusceptibility pending Gram stain aer bottle: Gram positive cocci in clusters resembling Staph   Results called to and read back by:João Russell RN 02/28/2017    12:24  Gram stain keith bottle: Gram positive cocci in clusters resembling Staph   02/28/2017  20:43 No Growth to date  No Growth to date No Growth to date  No Growth to date     CBC:   Recent Labs  Lab 02/28/17  0636 03/01/17  0708   WBC 19.24* 16.34*   HGB 12.9 12.9   HCT 38.4 38.2    341     CMP:   Recent Labs  Lab 02/28/17  0636 03/01/17  0709   * 138   K 4.0 4.2    111*   CO2 21* 19*   * 109   BUN 41* 41*   CREATININE 0.9 0.9   CALCIUM 8.7 8.4*   ANIONGAP 7* 8   EGFRNONAA 55* 55*     Respiratory Culture:   Recent Labs  Lab 02/26/17  1644   GSRESP >10 epithelial cells per low power field  Few WBC's  Few Gram negative  rods  Few Gram positive rods  Many Gram positive cocci   RESPIRATORYC METHICILLIN RESISTANT STAPHYLOCOCCUS AUREUSMany       Significant Imaging: I have reviewed all pertinent imaging results/findings within the past 24 hours.

## 2017-03-03 LAB
ACANTHOCYTES BLD QL SMEAR: PRESENT
ANISOCYTOSIS BLD QL SMEAR: SLIGHT
BACTERIA BLD CULT: NORMAL
BASOPHILS # BLD AUTO: ABNORMAL K/UL
BASOPHILS NFR BLD: 0 %
BURR CELLS BLD QL SMEAR: ABNORMAL
DACRYOCYTES BLD QL SMEAR: ABNORMAL
DIFFERENTIAL METHOD: ABNORMAL
EOSINOPHIL # BLD AUTO: ABNORMAL K/UL
EOSINOPHIL NFR BLD: 4 %
ERYTHROCYTE [DISTWIDTH] IN BLOOD BY AUTOMATED COUNT: 14.1 %
GIANT PLATELETS BLD QL SMEAR: PRESENT
HCT VFR BLD AUTO: 36.3 %
HGB BLD-MCNC: 11.9 G/DL
HYPOCHROMIA BLD QL SMEAR: ABNORMAL
LYMPHOCYTES # BLD AUTO: ABNORMAL K/UL
LYMPHOCYTES NFR BLD: 15 %
MCH RBC QN AUTO: 30.5 PG
MCHC RBC AUTO-ENTMCNC: 32.8 %
MCV RBC AUTO: 93 FL
METAMYELOCYTES NFR BLD MANUAL: 3 %
MONOCYTES # BLD AUTO: ABNORMAL K/UL
MONOCYTES NFR BLD: 5 %
MYELOCYTES NFR BLD MANUAL: 3 %
NEUTROPHILS NFR BLD: 65 %
NEUTS BAND NFR BLD MANUAL: 5 %
OVALOCYTES BLD QL SMEAR: ABNORMAL
PLATELET # BLD AUTO: 254 K/UL
PLATELET BLD QL SMEAR: ABNORMAL
PMV BLD AUTO: 9.3 FL
POIKILOCYTOSIS BLD QL SMEAR: SLIGHT
POLYCHROMASIA BLD QL SMEAR: ABNORMAL
RBC # BLD AUTO: 3.9 M/UL
SCHISTOCYTES BLD QL SMEAR: PRESENT
VANCOMYCIN SERPL-MCNC: 16.9 UG/ML
WBC # BLD AUTO: 12.97 K/UL

## 2017-03-03 PROCEDURE — 27000221 HC OXYGEN, UP TO 24 HOURS

## 2017-03-03 PROCEDURE — 25000242 PHARM REV CODE 250 ALT 637 W/ HCPCS: Performed by: NURSE PRACTITIONER

## 2017-03-03 PROCEDURE — 25000003 PHARM REV CODE 250: Performed by: EMERGENCY MEDICINE

## 2017-03-03 PROCEDURE — 63600175 PHARM REV CODE 636 W HCPCS: Performed by: INTERNAL MEDICINE

## 2017-03-03 PROCEDURE — 94640 AIRWAY INHALATION TREATMENT: CPT

## 2017-03-03 PROCEDURE — 63600175 PHARM REV CODE 636 W HCPCS: Performed by: FAMILY MEDICINE

## 2017-03-03 PROCEDURE — 25000003 PHARM REV CODE 250: Performed by: NURSE PRACTITIONER

## 2017-03-03 PROCEDURE — 25000003 PHARM REV CODE 250: Performed by: FAMILY MEDICINE

## 2017-03-03 PROCEDURE — 99232 SBSQ HOSP IP/OBS MODERATE 35: CPT | Mod: ,,, | Performed by: INTERNAL MEDICINE

## 2017-03-03 PROCEDURE — 85027 COMPLETE CBC AUTOMATED: CPT

## 2017-03-03 PROCEDURE — 21400001 HC TELEMETRY ROOM

## 2017-03-03 PROCEDURE — 80202 ASSAY OF VANCOMYCIN: CPT

## 2017-03-03 PROCEDURE — 36415 COLL VENOUS BLD VENIPUNCTURE: CPT

## 2017-03-03 PROCEDURE — 92610 EVALUATE SWALLOWING FUNCTION: CPT

## 2017-03-03 PROCEDURE — 85007 BL SMEAR W/DIFF WBC COUNT: CPT

## 2017-03-03 RX ORDER — DIGOXIN 0.25 MG/ML
250 INJECTION INTRAMUSCULAR; INTRAVENOUS ONCE
Status: COMPLETED | OUTPATIENT
Start: 2017-03-03 | End: 2017-03-03

## 2017-03-03 RX ADMIN — METRONIDAZOLE 500 MG: 500 INJECTION, SOLUTION INTRAVENOUS at 06:03

## 2017-03-03 RX ADMIN — VANCOMYCIN HYDROCHLORIDE 1000 MG: 1 INJECTION, POWDER, LYOPHILIZED, FOR SOLUTION INTRAVENOUS at 09:03

## 2017-03-03 RX ADMIN — METRONIDAZOLE 500 MG: 500 INJECTION, SOLUTION INTRAVENOUS at 01:03

## 2017-03-03 RX ADMIN — IPRATROPIUM BROMIDE AND ALBUTEROL SULFATE 3 ML: .5; 3 SOLUTION RESPIRATORY (INHALATION) at 09:03

## 2017-03-03 RX ADMIN — HEPARIN SODIUM 5000 UNITS: 5000 INJECTION, SOLUTION INTRAVENOUS; SUBCUTANEOUS at 10:03

## 2017-03-03 RX ADMIN — IPRATROPIUM BROMIDE AND ALBUTEROL SULFATE 3 ML: .5; 3 SOLUTION RESPIRATORY (INHALATION) at 02:03

## 2017-03-03 RX ADMIN — ASCORBIC ACID, VITAMIN A PALMITATE, CHOLECALCIFEROL, THIAMINE HYDROCHLORIDE, RIBOFLAVIN-5 PHOSPHATE SODIUM, PYRIDOXINE HYDROCHLORIDE, NIACINAMIDE, DEXPANTHENOL, ALPHA-TOCOPHEROL ACETATE, VITAMIN K1, FOLIC ACID, BIOTIN, CYANOCOBALAMIN: 200; 3300; 200; 6; 3.6; 6; 40; 15; 10; 150; 600; 60; 5 INJECTION, SOLUTION INTRAVENOUS at 01:03

## 2017-03-03 RX ADMIN — AMIODARONE HYDROCHLORIDE 0.5 MG/MIN: 1.8 INJECTION, SOLUTION INTRAVENOUS at 12:03

## 2017-03-03 RX ADMIN — DIGOXIN 250 MCG: 0.25 INJECTION INTRAMUSCULAR; INTRAVENOUS at 05:03

## 2017-03-03 RX ADMIN — HEPARIN SODIUM 5000 UNITS: 5000 INJECTION, SOLUTION INTRAVENOUS; SUBCUTANEOUS at 06:03

## 2017-03-03 RX ADMIN — HEPARIN SODIUM 5000 UNITS: 5000 INJECTION, SOLUTION INTRAVENOUS; SUBCUTANEOUS at 01:03

## 2017-03-03 RX ADMIN — METRONIDAZOLE 500 MG: 500 INJECTION, SOLUTION INTRAVENOUS at 10:03

## 2017-03-03 RX ADMIN — IPRATROPIUM BROMIDE AND ALBUTEROL SULFATE 3 ML: .5; 3 SOLUTION RESPIRATORY (INHALATION) at 08:03

## 2017-03-03 NOTE — ASSESSMENT & PLAN NOTE
- Positive for staph capitus  - cont vanc (will need 2 weeks)  - Will need PICC line IF treatment at the NH is desired  - Spoke with 2 dtrs last two days and a grand dtr at bedside today.  Family has not decided if they would like to do hospice vs continue treatment.  They understand that if pt is being treated she will need a PICC line and IV antibx for 2 weeks at the NH.  Concerns for the pt pulling out the PICC like as she dose get confused with her underlying dementia.  Pts son is coming into town this afternoon and the family will be discussing the plan moving fwd.  All family members I have spoken with understand that she cannot have IV antibiotics on hospice and given the types of infections she has there is no PO options, in addition, pt is unable to swallow without aspirating.  If family desires continued treatment a PICC will need to be placed.

## 2017-03-03 NOTE — PLAN OF CARE
Problem: Patient Care Overview  Goal: Plan of Care Review  Outcome: Ongoing (interventions implemented as appropriate)  Patient doing well, VSS, clinimix gtt started, resting quietly in bed. damily at bedside, POC discussed.

## 2017-03-03 NOTE — SUBJECTIVE & OBJECTIVE
Interval History: Spoke at length with grand dtr at bedside about hospice.  Some family coming in this afternoon and they will discuss and let us know if she will be hospice vs treatment.    Review of Systems   Unable to perform ROS: Dementia     Objective:     Vital Signs (Most Recent):  Temp: 98.4 °F (36.9 °C) (03/03/17 1200)  Pulse: 99 (03/03/17 1407)  Resp: 20 (03/03/17 1407)  BP: 137/65 (03/03/17 1200)  SpO2: 95 % (03/03/17 1200) Vital Signs (24h Range):  Temp:  [97.4 °F (36.3 °C)-98.4 °F (36.9 °C)] 98.4 °F (36.9 °C)  Pulse:  [] 99  Resp:  [16-22] 20  SpO2:  [90 %-95 %] 95 %  BP: (118-152)/(58-98) 137/65     Weight: 73.9 kg (162 lb 14.7 oz)  Body mass index is 31.82 kg/(m^2).    Intake/Output Summary (Last 24 hours) at 03/03/17 1419  Last data filed at 03/03/17 0612   Gross per 24 hour   Intake           1626.7 ml   Output                0 ml   Net           1626.7 ml      Physical Exam   Constitutional: She appears well-developed.   Elderly, ill appearing   HENT:   Head: Normocephalic and atraumatic.   Eyes: Conjunctivae and EOM are normal. Pupils are equal, round, and reactive to light.   Neck: Neck supple.   Cardiovascular: Normal rate and normal heart sounds.  Exam reveals no friction rub.    No murmur heard.  Irregular rhythm   Pulmonary/Chest: Effort normal.   Coarse BS B/l  Weak cough reflex   Abdominal: Soft. Bowel sounds are normal.   Musculoskeletal: She exhibits no edema or tenderness.   Neurological: She is alert.   Skin: Skin is warm and dry.       Significant Labs:   Blood Culture: No results for input(s): LABBLOO in the last 48 hours.  BMP:   Recent Labs  Lab 03/02/17  1644   *      K 4.0   *   CO2 21*   BUN 25   CREATININE 0.8   CALCIUM 8.4*   MG 1.8     CBC:   Recent Labs  Lab 03/02/17  0446 03/03/17  0532   WBC 13.95* 12.97*   HGB 12.9 11.9*   HCT 39.2 36.3*    254     Respiratory Culture: No results for input(s): GSRESP, RESPIRATORYC in the last 48  hours.  Urine Culture: No results for input(s): LABURIN in the last 48 hours.    Significant Imaging: CT:  I have reviewed all pertinent imaging results/findings within the past 24 hours.

## 2017-03-03 NOTE — PLAN OF CARE
Problem: Patient Care Overview  Goal: Plan of Care Review  Outcome: Ongoing (interventions implemented as appropriate)  Pt awake, alert, but oriented only to self. POC discussed w/patient and family, verbalized understanding. Afib on monitor, rate 90s-70s. Amiodarone gtt continued; reduced to 0.5mg/min as ordered. Contact precautions maintained; pt family educated, but noncompliant, encouraged to wash hands frequently. VSS. Incont in brief, turned Q2 w/foam wedge, no skin breakdown noted. Patient turns independently in bed. Fall precautions in place, bed alarm on.

## 2017-03-03 NOTE — PROGRESS NOTES
Amy Guzman [id number] is a 89 y.o. female who has been consulted for vancomycin dosing.    The patient has the following labs:     Date Creatinine (mg/dl)    BUN WBC Count   3/3/2017 Estimated Creatinine Clearance: 39.5 mL/min (based on Cr of 0.8). Lab Results   Component Value Date    BUN 25 03/02/2017     Lab Results   Component Value Date    WBC 12.97 (H) 03/03/2017        Current weight is 73.9 kg (162 lb 14.7 oz)    Vancomycin trough from 3/3/17 was 16.9 mg/dL.  Target trough range is 15-20.  Pharmacy will continue dose.   The patient will be continued on a vancomycin dose of 1000 mg every 36 hours. A vancomycin trough has been ordered for 3/5/17 at 08:00.  Patient will be followed by pharmacy for changes in renal function, toxicity, and efficacy.    Thank you for allowing us to participate in this patient's care.     Ila Arias

## 2017-03-03 NOTE — NURSING
Amiodarone 150mg/100ml bolus initiated over 10 minutes at this time. Them Amiodarone IV infusion at 1mg/min initiated.  Patient tolerating well. Remains AFib with HR 90s.

## 2017-03-03 NOTE — PLAN OF CARE
Problem: Patient Care Overview  Goal: Plan of Care Review  Outcome: Ongoing (interventions implemented as appropriate)  o2 sat on nc 3l/m=93%; tolerates txs well; no resp distress noted.

## 2017-03-03 NOTE — ASSESSMENT & PLAN NOTE
- MRSA positive in respiratory cultures  - On Vanc/flagyl  - Avelox dcd - discussed with Dr. Quinones  - SLP repeat 3/3/17 - pt has declined.  Case d/w [facility], who will re evaluate, but at this time pt was aspirating at all consistencies.  This is decline from original eval on 2/28/17.  If pts swallowing does not improve, she will likely need a PEG tube vs hospice.

## 2017-03-03 NOTE — PT/OT/SLP EVAL
Speech Language Pathology  Evaluation    Amy Guzman   MRN: [id number]   Admitting Diagnosis: Pneumonia of both lower lobes due to infectious organism    Diet recommendations: Solid Diet Level: NPO  Liquid Diet Level: NPO     SLP Treatment Date: 17  Speech Start Time: 1000     Speech Stop Time: 1030     Speech Total (min): 30 min       TREATMENT BILLABLE MINUTES:  Eval Swallow and Oral Function 30    Diagnosis: Pneumonia of both lower lobes due to infectious organism    Past Medical History:   Diagnosis Date    Dementia     GERD (gastroesophageal reflux disease)     Hyperlipidemia     Hypertension     Localized edema     Osteoarthritis     Pneumonia     Rotator cuff tear     UTI (urinary tract infection)      Past Surgical History:   Procedure Laterality Date    esophogus surgery      FEMUR SURGERY      TOTAL KNEE ARTHROPLASTY         Has the patient been evaluated by SLP for swallowing? : Yes  Keep patient NPO?: Yes   General Precautions: Standard, aspiration, vision impaired    Current Respiratory Status: nasal cannula     Social Hx: Patient lives at a nursing home with good family support.    Prior diet: regular with thin liquids    Occupational/hobbies/homemaking: n/a    Subjective:  Pt was seen with daughter and granddaughter at bedside.  Daughter reports that the pt had started refusing po intake yesterday and was pocketing puree in days previous.   Patient goals: none stated.    Pain Ratin/10   Pain Rating Post-Intervention: 0/10    Objective:   Patient found with: peripheral IV, telemetry, oxygen    Oral Musculature Evaluation  Oral Musculature: general weakness  Dentition:  (edentulous upper jaw with scattered lower dentition)  Mucosal Quality: good     Bedside Swallow Eval:  Consistencies Assessed: thin liquids, ice chip, and puree   Oral Phase: Pt allowed bolus presentation but quickly made a face and spit out consistencies  Pharyngeal Phase: she did produce a swallow after  spitting out bolus and exhibited coughing and watery eyes which indicates possible aspiration    Additional Treatment:      FIM:                                 Assessment:  Amy Guzman is a 89 y.o. female with a medical diagnosis of Pneumonia of both lower lobes due to infectious organism and presents with dyspahgia.  At this time pt is not safe for po intake and will require alternative source for nutrition/hydration with an ongoing swallow assessment.           Discharge recommendations:       Goals:   SLP Goals        Problem: SLP Goal    Goal Priority Disciplines Outcome   SLP Goal     SLP    Description:  1.  Ongoing swallow assessment to determine swallow safety and po readiness                 Plan:   Patient to be seen Therapy Frequency: 2 x/week   Plan of Care expires: 03/10/17  Plan of Care reviewed with: patient, family  SLP Follow-up?: Yes              Dana Curtis CCC-SLP  03/03/2017

## 2017-03-03 NOTE — ASSESSMENT & PLAN NOTE
- Cardiology informed  - stat echo  - stat MG, Phos, and BMP  - Start Amiodarone drip with bolus  - metoprolol 5mg IV push  - transfer to tele

## 2017-03-03 NOTE — PROGRESS NOTES
Cardiology Progress Note        SUBJECTIVE:     History of Present Illness:  Patient is a 89 y.o. female presents with new onset A Fib. Vital signs stable. A Fib rate continues to be well controled on Amiodarone drip. Family at beside and states she continues not to eat. No sign or chest pain or shortness of breath, vital signs are stable.       OBJECTIVE:     Vital Signs (Most Recent)  Temp: 98.4 °F (36.9 °C) (03/03/17 1515)  Pulse: 95 (03/03/17 1515)  Resp: 18 (03/03/17 1515)  BP: 103/68 (03/03/17 1515)  SpO2: (!) 91 % (03/03/17 1515)    Vital Signs Range (Last 24H):  Temp:  [97.4 °F (36.3 °C)-98.4 °F (36.9 °C)]   Pulse:  []   Resp:  [16-22]   BP: (103-152)/(58-98)   SpO2:  [90 %-95 %]     Intake/Output last 3 shifts:  I/O last 3 completed shifts:  In: 3380 [P.O.:410; I.V.:2220; IV Piggyback:750]  Out: -     Intake/Output this shift:  I/O this shift:  In: 100 [IV Piggyback:100]  Out: -     Review of patient's allergies indicates:   Allergen Reactions    Lidocaine      Hallucinations    Penicillins Rash    Sulfa (sulfonamide antibiotics) Rash       Current Facility-Administered Medications   Medication    0.9%  NaCl infusion    acetaminophen tablet 650 mg    albuterol-ipratropium 2.5mg-0.5mg/3mL nebulizer solution 3 mL    Amino acid 2.75% - dextrose 10% (CLINIMIX-E) solution with additives ( 1L provides 27.5 gm AA, 100 gm CHO (340 kcal/L dextrose), Na 35, K 30, Mg 5, Ca 4.5, Acetate 51, Cl 39, Phos 15)    amiodarone 360 mg/200 mL (1.8 mg/mL) infusion    aspirin chewable tablet 81 mg    atorvastatin tablet 20 mg    bisacodyl suppository 10 mg    famotidine tablet 20 mg    haloperidol lactate injection 2 mg    heparin (porcine) injection 5,000 Units    lorazepam injection 0.5 mg    metoprolol succinate (TOPROL-XL) 24 hr tablet 25 mg    metronidazole IVPB 500 mg    ondansetron injection 4 mg    vancomycin 1 g in dextrose 5 % 250 mL IVPB (ready to mix system)     No current  facility-administered medications on file prior to encounter.      No current outpatient prescriptions on file prior to encounter.       Physical Exam:  General appearance: alert, appears stated age and cooperative  Head: Normocephalic, without obvious abnormality, atraumatic  Eyes:  conjunctivae/corneas clear. PERRL, EOM's intact. Fundi benign.  Nose: no discharge  Throat: normal findings: tongue midline and normal  Neck: no adenopathy, no carotid bruit, no JVD, supple, symmetrical, trachea midline and thyroid not enlarged, symmetric, no tenderness/mass/nodules  Back:  no skin lesions, erythema, or scars  Lungs:  clear to auscultation bilaterally  Chest wall: no tenderness  Heart: regular rate and rhythm, S1, S2 normal, no murmur, click, rub or gallop  Abdomen: soft, non-tender; bowel sounds normal; no masses,  no organomegaly  Extremities: extremities normal, atraumatic, no cyanosis or edema  Pulses: 2+ and symmetric  Skin: Skin color, texture, turgor normal. No rashes or lesions  Neurologic: Grossly normal    Laboratory:  Chemistry:   Lab Results   Component Value Date     03/02/2017    K 4.0 03/02/2017     (H) 03/02/2017    CO2 21 (L) 03/02/2017    BUN 25 03/02/2017    CREATININE 0.8 03/02/2017    CALCIUM 8.4 (L) 03/02/2017     Cardiac Markers:   Lab Results   Component Value Date    TROPONINI 0.037 (H) 02/26/2017     Cardiac Markers (Last 3):   Lab Results   Component Value Date    TROPONINI 0.037 (H) 02/26/2017     CBC:   Lab Results   Component Value Date    WBC 12.97 (H) 03/03/2017    HGB 11.9 (L) 03/03/2017    HCT 36.3 (L) 03/03/2017    MCV 93 03/03/2017     03/03/2017     Lipids: No results found for: CHOL, TRIG, HDL, LDLDIRECT  Coagulation: No results found for: INR, APTT    Diagnostic Results:  ECG: Reviewed  X-Ray: Reviewed  US: Reviewed  CT: Reviewed  Echo: Reviewed      ASSESSMENT/PLAN:     Patient Active Problem List   Diagnosis    Pneumonia of both lower lobes due to infectious  organism    Acute cystitis without hematuria    Vascular dementia without behavioral disturbance    Swallowing difficulty    UTI (urinary tract infection) due to Enterococcus    New onset a-fib    Bacteremia    V-tach        Plan:     Will continue current medications and treatment plan  Continue Amiodarone drip   Continue Toprol XL, Statin and ASA when tolerating PO  Switch to Amiodarone PO when tolerating PO foods.      Chart reviewed. Dr. Escobar agrees with plan as outlined above.

## 2017-03-03 NOTE — SUBJECTIVE & OBJECTIVE
Interval History: Spoke with eldest dtr at length about overall prognosis and they understand that she is extremely sick given then number infections.  She began to have bouts of vtach this evening and was started on an amiodarone drip per recommendations from cardiology.  She will be transferred to Telemetry.    Review of Systems   Unable to perform ROS: Dementia     Objective:     Vital Signs (Most Recent):  Temp: 98.3 °F (36.8 °C) (03/02/17 1647)  Pulse: 96 (03/02/17 1719)  Resp: (!) 22 (03/02/17 1647)  BP: (!) 143/61 (03/02/17 1719)  SpO2: (!) 94 % (03/02/17 1647) Vital Signs (24h Range):  Temp:  [97.3 °F (36.3 °C)-98.3 °F (36.8 °C)] 98.3 °F (36.8 °C)  Pulse:  [] 96  Resp:  [18-22] 22  SpO2:  [93 %-97 %] 94 %  BP: ()/(54-83) 143/61     Weight: 73.9 kg (162 lb 14.7 oz)  Body mass index is 31.82 kg/(m^2).    Intake/Output Summary (Last 24 hours) at 03/02/17 1804  Last data filed at 03/02/17 1748   Gross per 24 hour   Intake             2130 ml   Output                0 ml   Net             2130 ml      Physical Exam   Constitutional:   Elderly, ill appearting   HENT:   Head: Normocephalic and atraumatic.   Eyes: Conjunctivae and EOM are normal. Pupils are equal, round, and reactive to light.   Neck: Neck supple.   Cardiovascular: Normal heart sounds.  Exam reveals no friction rub.    No murmur heard.  Irregularly irregular (130s-160s)  Intermittent Vtach   Pulmonary/Chest: Effort normal and breath sounds normal.   Abdominal: Soft. Bowel sounds are normal.   Musculoskeletal: She exhibits no edema or tenderness.   Neurological: She is alert.   Skin: Skin is warm and dry.       Significant Labs:   BMP:   Recent Labs  Lab 03/02/17  1644   *      K 4.0   *   CO2 21*   BUN 25   CREATININE 0.8   CALCIUM 8.4*   MG 1.8     CBC:   Recent Labs  Lab 03/01/17  0708 03/02/17  0446   WBC 16.34* 13.95*   HGB 12.9 12.9   HCT 38.2 39.2    290       Significant Imaging: I have reviewed all  pertinent imaging results/findings within the past 24 hours.

## 2017-03-03 NOTE — NURSING
Patient received from Med Surg  Via bed  In no acute distress.  Report received from MUKUL Tolbert   At bedside. Continuous telemetry monitoring in progress, and patient noted to be in  Afib  With HR = 103  , Vitals taken and assessment completed.  Patient and family  oriented to room, given and instructed on call light system, fall precautions, hourly rounding, and room service.  Bed low and locked and alarm on.   Patient tachypneic, RR 22, Sats 91% on 3L NC, BBS coarse, CR to bases. 1+ BLE edema noted.  Patient on contact isolation for MRSA in sputum. Orders to initiate Amiodarone gtt noted.  Please see assessment flowsheet for further interventions.  Bere Pablo RN

## 2017-03-03 NOTE — CONSULTS
[facility] -   Adult Nutrition  Consult Note    SUMMARY     Recommendations  Recommendation/Intervention: 1. Resume oral diet when medically able    2. If unable to resume oral diet within 48 hours consider nutrition support to meet needs until patient can tolerate oral diet or long term nutrition support decision is made.  Goals: oral diet or nutrition support within 3 days  Nutrition Goal Status: new  Communication of RD Recs: discussed on rounds    Nutrition Discharge Plan  Back to nursing home on Puree diet with oral nutrition supplement as needed    Reason for Assessment  Reason for Assessment: RD follow-up  Diagnosis:  (Septic Shock, Acute respiratory failure)  Relevent Medical History: HTN, HLD, OA, GERD, Dementia   Interdisciplinary Rounds: attended  General Information Comments: Patient unable to answer questions regarding usual intake or weight. No EPIC records for review. Per reports during rounds patient was having poor intake x1 week prior to admission with difficulty swallowing.    Nutrition Prescription Ordered  Current Diet Order: NPO     Nutrition Risk Screen  Nutrition Risk Screen: dysphagia or difficulty swallowing    Nutrition/Diet History  Typical Food/Fluid Intake: Patient was davcned to puree diet aft6er last RD visit however this AM during SLP visit patient showed signs of possible aspiration, made NPO again with ongoing speech therapy.  Factors Affecting Nutritional Intake: difficulty/impaired swallowing    Labs/Tests/Procedures/Meds  Pertinent Labs Reviewed: reviewed  Pertinent Labs Comments: Gluc 126  Pertinent Medications Reviewed: reviewed    Physical Findings  Oral/Mouth Cavity: tooth/teeth missing  Skin:  (Amor 15)    Anthropometrics  Height (inches): 60 in  Weight Method: Bed Scale  Weight (kg): 73.9 kg  Ideal Body Weight (IBW), Female: 100 lb  % Ideal Body Weight, Female (lb): 162.92 lb  BMI (kg/m2): 31.82  BMI Grade: 30 - 34.9- obesity - grade I    Estimated/Assessed  Needs  Weight Used For Calorie Calculations: 74 kg (163 lb 2.3 oz)   Height (cm): 152.4 cm  Energy Need Method: Emmons-St Jason (x1.3=4440)  Weight Used For Protein Calculations: 74 kg (163 lb 2.3 oz)  0.8 gm Protein (gm): 59.32 and 1.0 gm Protein (gm): 74.15    Monitor and Evaluation  Food and Nutrient Intake: food and beverage intake  Food and Nutrient Adminstration: diet order  Physical Activity and Function: nutrition-related ADLs and IADLs  Anthropometric Measurements: weight  Biochemical Data, Medical Tests and Procedures: electrolyte and renal panel, glucose/endocrine profile    Nutrition Risk  Level of Risk:  (2x weekly)    Nutrition Follow-Up  RD Follow-up?: Yes    Assessment and Plan (PES)  Swallowing difficulty  Nutrition Problem:   Swallowing difficulty    Etiology/Related to:   Patient with possible aspiration per SLP note    As evidenced by:  Decreased oral intake with signs of possible aspiration during SLP evaluation    Treatment Strategy:   1. Oral diet when medically able  2. If unable to resume oral diet within 48 hours consider alternate nutrition support    Nutrition Diagnosis Status:   New

## 2017-03-03 NOTE — PROGRESS NOTES
[facility] Hospital Medicine  Progress Note    Patient Name: Amy Guzman  MRN: [id number]  Patient Class: IP- Inpatient   Admission Date: 2/26/2017  Length of Stay: 4 days  Attending Physician: Vicky Zarate MD  Primary Care Provider: Primary Doctor No        Subjective:     Principal Problem:Pneumonia of both lower lobes due to infectious organism    HPI:  Amy Guzman is a 89 y.o. female NH resident with a PMH of HTN, HLD, OA, GERD and Dementia who was brought to ER by EMS for progressive cough and SOB for 1 week. Per [facility], patient spO2 was 60% at the nursing home which increased to 77% on 4L O2. They report giving patient a DuoNeb treatment, Albuterol, and 125mg Solumedrol. Associated with dry cough and subjective fever. Pt denies any chills, fatigue, nausea, vomiting, CP, leg pain/swelling, and all other sx. No further complaints or concerns at this time.     Hospital Course:  In ED creatinine 1.6, PaO2 53, UA+ and CXR with bilat patchy infiltrates.  BP dropped to 88/35 in ED. Sepsis protocol started, given triple IV Abx and IVF vol resuscitation.  Admitted to ICU. No pressors needed. Initial urine cloudy and dirty but now urine clearing up. Pt did well, was transferred to floor. Her Blood and Urine Cx is growing GPC-- she is on Vanco/ Avelox. ID following. She also developed Afib with RVR treated with IV dig. She is also very restless, agitated today requiring IV haldol and Ativan. She is a DNR, family would like her to return to [facility] as Hospice now,            Interval History: Spoke with eldest dtr at length about overall prognosis and they understand that she is extremely sick given then number infections.  She began to have bouts of vtach this evening and was started on an amiodarone drip per recommendations from cardiology.  She will be transferred to Telemetry.    Review of Systems   Unable to perform ROS: Dementia     Objective:     Vital Signs (Most Recent):  Temp:  98.3 °F (36.8 °C) (03/02/17 1647)  Pulse: 96 (03/02/17 1719)  Resp: (!) 22 (03/02/17 1647)  BP: (!) 143/61 (03/02/17 1719)  SpO2: (!) 94 % (03/02/17 1647) Vital Signs (24h Range):  Temp:  [97.3 °F (36.3 °C)-98.3 °F (36.8 °C)] 98.3 °F (36.8 °C)  Pulse:  [] 96  Resp:  [18-22] 22  SpO2:  [93 %-97 %] 94 %  BP: ()/(54-83) 143/61     Weight: 73.9 kg (162 lb 14.7 oz)  Body mass index is 31.82 kg/(m^2).    Intake/Output Summary (Last 24 hours) at 03/02/17 1804  Last data filed at 03/02/17 1748   Gross per 24 hour   Intake             2130 ml   Output                0 ml   Net             2130 ml      Physical Exam   Constitutional:   Elderly, ill appearting   HENT:   Head: Normocephalic and atraumatic.   Eyes: Conjunctivae and EOM are normal. Pupils are equal, round, and reactive to light.   Neck: Neck supple.   Cardiovascular: Normal heart sounds.  Exam reveals no friction rub.    No murmur heard.  Irregularly irregular (130s-160s)  Intermittent Vtach   Pulmonary/Chest: Effort normal and breath sounds normal.   Abdominal: Soft. Bowel sounds are normal.   Musculoskeletal: She exhibits no edema or tenderness.   Neurological: She is alert.   Skin: Skin is warm and dry.       Significant Labs:   BMP:   Recent Labs  Lab 03/02/17  1644   *      K 4.0   *   CO2 21*   BUN 25   CREATININE 0.8   CALCIUM 8.4*   MG 1.8     CBC:   Recent Labs  Lab 03/01/17  0708 03/02/17  0446   WBC 16.34* 13.95*   HGB 12.9 12.9   HCT 38.2 39.2    290       Significant Imaging: I have reviewed all pertinent imaging results/findings within the past 24 hours.    Assessment/Plan:      * Pneumonia of both lower lobes due to infectious organism  - MRSA positive in respiratory cultures  - On Vanc/flagyl  - Avelox dcd - discussed with Dr. Quinones  - SLP reviewed - no aspiration, recs: Pureed with thin liquids      Acute cystitis without hematuria  - Enterococcus positive  - Cont vanc  - ID on board    Vascular dementia  without behavioral disturbance  Advance, NH resident, bed bound, needs total care.      Swallowing difficulty  No aspiration on Swallow study  - cont pureed diet      UTI (urinary tract infection) due to Enterococcus  As above      New onset a-fib  - cont metoprolol, ASA, Statin  - Cardiology consulted    Bacteremia  - Positive for staph (awaiting sensitivities)  - Likely MRSA as MRSA is in her respiratory culture  - cont vanc      V-tach  - Cardiology informed  - stat echo  - stat MG, Phos, and BMP  - Start Amiodarone drip with bolus  - metoprolol 5mg IV push  - transfer to [facility]      Acute renal failure, resolved as of 3/2/2017  - Improving with IVF  - pt encouraged to increase PO intake.      VTE Risk Mitigation         Ordered     heparin (porcine) injection 5,000 Units  Every 8 hours     Route:  Subcutaneous        02/26/17 1505     Medium Risk of VTE  Once      02/26/17 1430     Place sequential compression device  Until discontinued      02/26/17 1430          Vicky Zarate MD  Department of Hospital Medicine   [facility]

## 2017-03-03 NOTE — PROGRESS NOTES
[facility] Hospital Medicine  Progress Note    Patient Name: Amy Guzman  MRN: [id number]  Patient Class: IP- Inpatient   Admission Date: 2/26/2017  Length of Stay: 5 days  Attending Physician: Vicky Zarate MD  Primary Care Provider: Primary Doctor No        Subjective:     Principal Problem:Pneumonia of both lower lobes due to infectious organism    HPI:  Amy Guzman is a 89 y.o. female NH resident with a PMH of HTN, HLD, OA, GERD and Dementia who was brought to ER by EMS for progressive cough and SOB for 1 week. Per [facility], patient spO2 was 60% at the nursing home which increased to 77% on 4L O2. They report giving patient a DuoNeb treatment, Albuterol, and 125mg Solumedrol. Associated with dry cough and subjective fever. Pt denies any chills, fatigue, nausea, vomiting, CP, leg pain/swelling, and all other sx. No further complaints or concerns at this time.     Hospital Course:  In ED creatinine 1.6, PaO2 53, UA+ and CXR with bilat patchy infiltrates.  BP dropped to 88/35 in ED. Sepsis protocol started, given triple IV Abx and IVF vol resuscitation.  Admitted to ICU. No pressors needed. Initial urine cloudy and dirty but now urine clearing up. Pt did well, was transferred to floor. Her Blood and Urine Cx is growing GPC-- she is on Vanco/ Avelox. ID following. She also developed Afib with RVR treated with IV dig. She is also very restless, agitated today requiring IV haldol and Ativan. She is a DNR, family would like her to return to [facility] as Hospice now,            Interval History: Spoke at length with grand dtr at bedside about hospice.  Some family coming in this afternoon and they will discuss and let us know if she will be hospice vs treatment.    Review of Systems   Unable to perform ROS: Dementia     Objective:     Vital Signs (Most Recent):  Temp: 98.4 °F (36.9 °C) (03/03/17 1200)  Pulse: 99 (03/03/17 1407)  Resp: 20 (03/03/17 1407)  BP: 137/65 (03/03/17 1200)  SpO2: 95 %  (03/03/17 1200) Vital Signs (24h Range):  Temp:  [97.4 °F (36.3 °C)-98.4 °F (36.9 °C)] 98.4 °F (36.9 °C)  Pulse:  [] 99  Resp:  [16-22] 20  SpO2:  [90 %-95 %] 95 %  BP: (118-152)/(58-98) 137/65     Weight: 73.9 kg (162 lb 14.7 oz)  Body mass index is 31.82 kg/(m^2).    Intake/Output Summary (Last 24 hours) at 03/03/17 1419  Last data filed at 03/03/17 0612   Gross per 24 hour   Intake           1626.7 ml   Output                0 ml   Net           1626.7 ml      Physical Exam   Constitutional: She appears well-developed.   Elderly, ill appearing   HENT:   Head: Normocephalic and atraumatic.   Eyes: Conjunctivae and EOM are normal. Pupils are equal, round, and reactive to light.   Neck: Neck supple.   Cardiovascular: Normal rate and normal heart sounds.  Exam reveals no friction rub.    No murmur heard.  Irregular rhythm   Pulmonary/Chest: Effort normal.   Coarse BS B/l  Weak cough reflex   Abdominal: Soft. Bowel sounds are normal.   Musculoskeletal: She exhibits no edema or tenderness.   Neurological: She is alert.   Skin: Skin is warm and dry.       Significant Labs:   Blood Culture: No results for input(s): LABBLOO in the last 48 hours.  BMP:   Recent Labs  Lab 03/02/17  1644   *      K 4.0   *   CO2 21*   BUN 25   CREATININE 0.8   CALCIUM 8.4*   MG 1.8     CBC:   Recent Labs  Lab 03/02/17  0446 03/03/17  0532   WBC 13.95* 12.97*   HGB 12.9 11.9*   HCT 39.2 36.3*    254     Respiratory Culture: No results for input(s): GSRESP, RESPIRATORYC in the last 48 hours.  Urine Culture: No results for input(s): LABURIN in the last 48 hours.    Significant Imaging: CT:  I have reviewed all pertinent imaging results/findings within the past 24 hours.    Assessment/Plan:      * Pneumonia of both lower lobes due to infectious organism  - MRSA positive in respiratory cultures  - On Vanc/flagyl  - Avelox dcd - discussed with Dr. Quinones  - SLP repeat 3/3/17 - pt has declined.  Case d/w ST [facility],  who will re evaluate, but at this time pt was aspirating at all consistencies.  This is decline from original eval on 2/28/17.  If pts swallowing does not improve, she will likely need a PEG tube vs hospice.       Acute cystitis without hematuria  - Enterococcus positive  - Cont vanc  - ID on board    Vascular dementia without behavioral disturbance  Advance, NH resident, bed bound, needs total care.      Swallowing difficulty  - Repeat Eval 3/3/17 - showed aspiration at all consistency  - Currently NPO with PPN  - ST will re eval as there was a decline from 2/28/17  - Spoke with grand dtr at bedside prior to eval that if her swallowing has decompensated the family will need to decide between PEG tube and hospice.  Given her co morbities including wide spread infection and underlying dementia pt will likely not do well with a PEG tube and will likely pull it out in a state of confusion.      UTI (urinary tract infection) due to Enterococcus  As above      Bacteremia  - Positive for staph capitus  - cont vanc (will need 2 weeks)  - Will need PICC line IF treatment at the NH is desired  - Spoke with 2 dtrs last two days and a grand dtr at bedside today.  Family has not decided if they would like to do hospice vs continue treatment.  They understand that if pt is being treated she will need a PICC line and IV antibx for 2 weeks at the NH.  Concerns for the pt pulling out the PICC like as she dose get confused with her underlying dementia.  Pts son is coming into town this afternoon and the family will be discussing the plan moving fwd.  All family members I have spoken with understand that she cannot have IV antibiotics on hospice and given the types of infections she has there is no PO options, in addition, pt is unable to swallow without aspirating.  If family desires continued treatment a PICC will need to be placed.      V-tach  - Cardiology on board  - on amiodarone IV        VTE Risk Mitigation         Ordered      heparin (porcine) injection 5,000 Units  Every 8 hours     Route:  Subcutaneous        02/26/17 1505     Medium Risk of VTE  Once      02/26/17 1430     Place sequential compression device  Until discontinued      02/26/17 1430          Vicky Zarate MD  Department of Hospital Medicine   [facility]

## 2017-03-03 NOTE — ASSESSMENT & PLAN NOTE
- Repeat Eval 3/3/17 - showed aspiration at all consistency  - Currently NPO with PPN  - ST will re eval as there was a decline from 2/28/17  - Spoke with grand dtr at bedside prior to eval that if her swallowing has decompensated the family will need to decide between PEG tube and hospice.  Given her co morbities including wide spread infection and underlying dementia pt will likely not do well with a PEG tube and will likely pull it out in a state of confusion.

## 2017-03-04 PROBLEM — N39.0 UTI (URINARY TRACT INFECTION) DUE TO ENTEROCOCCUS: Status: RESOLVED | Noted: 2017-02-28 | Resolved: 2017-03-04

## 2017-03-04 PROBLEM — B95.2 UTI (URINARY TRACT INFECTION) DUE TO ENTEROCOCCUS: Status: RESOLVED | Noted: 2017-02-28 | Resolved: 2017-03-04

## 2017-03-04 LAB
BACTERIA BLD CULT: NORMAL
BASOPHILS # BLD AUTO: 0.01 K/UL
BASOPHILS NFR BLD: 0.1 %
DIFFERENTIAL METHOD: ABNORMAL
EOSINOPHIL # BLD AUTO: 0.4 K/UL
EOSINOPHIL NFR BLD: 2.9 %
ERYTHROCYTE [DISTWIDTH] IN BLOOD BY AUTOMATED COUNT: 13.7 %
HCT VFR BLD AUTO: 34.1 %
HGB BLD-MCNC: 10.9 G/DL
LYMPHOCYTES # BLD AUTO: 1.4 K/UL
LYMPHOCYTES NFR BLD: 10.2 %
MCH RBC QN AUTO: 30.9 PG
MCHC RBC AUTO-ENTMCNC: 32 %
MCV RBC AUTO: 97 FL
MONOCYTES # BLD AUTO: 1.1 K/UL
MONOCYTES NFR BLD: 8 %
NEUTROPHILS # BLD AUTO: 10.7 K/UL
NEUTROPHILS NFR BLD: 78.8 %
PLATELET # BLD AUTO: 271 K/UL
PMV BLD AUTO: 9.1 FL
RBC # BLD AUTO: 3.53 M/UL
WBC # BLD AUTO: 13.53 K/UL

## 2017-03-04 PROCEDURE — 36415 COLL VENOUS BLD VENIPUNCTURE: CPT

## 2017-03-04 PROCEDURE — 63600175 PHARM REV CODE 636 W HCPCS: Performed by: FAMILY MEDICINE

## 2017-03-04 PROCEDURE — 92526 ORAL FUNCTION THERAPY: CPT

## 2017-03-04 PROCEDURE — 25000003 PHARM REV CODE 250: Performed by: EMERGENCY MEDICINE

## 2017-03-04 PROCEDURE — 25000242 PHARM REV CODE 250 ALT 637 W/ HCPCS: Performed by: NURSE PRACTITIONER

## 2017-03-04 PROCEDURE — 63600175 PHARM REV CODE 636 W HCPCS: Performed by: INTERNAL MEDICINE

## 2017-03-04 PROCEDURE — 25000003 PHARM REV CODE 250: Performed by: NURSE PRACTITIONER

## 2017-03-04 PROCEDURE — 99232 SBSQ HOSP IP/OBS MODERATE 35: CPT | Mod: ,,, | Performed by: INTERNAL MEDICINE

## 2017-03-04 PROCEDURE — 63600175 PHARM REV CODE 636 W HCPCS: Performed by: NURSE PRACTITIONER

## 2017-03-04 PROCEDURE — 27000221 HC OXYGEN, UP TO 24 HOURS

## 2017-03-04 PROCEDURE — 94640 AIRWAY INHALATION TREATMENT: CPT

## 2017-03-04 PROCEDURE — 85025 COMPLETE CBC W/AUTO DIFF WBC: CPT

## 2017-03-04 PROCEDURE — 21400001 HC TELEMETRY ROOM

## 2017-03-04 RX ORDER — FUROSEMIDE 10 MG/ML
80 INJECTION INTRAMUSCULAR; INTRAVENOUS ONCE
Status: COMPLETED | OUTPATIENT
Start: 2017-03-04 | End: 2017-03-04

## 2017-03-04 RX ORDER — MOXIFLOXACIN HYDROCHLORIDE 400 MG/250ML
400 INJECTION, SOLUTION INTRAVENOUS
Status: DISCONTINUED | OUTPATIENT
Start: 2017-03-04 | End: 2017-03-05

## 2017-03-04 RX ORDER — DIGOXIN 0.25 MG/ML
125 INJECTION INTRAMUSCULAR; INTRAVENOUS ONCE
Status: COMPLETED | OUTPATIENT
Start: 2017-03-04 | End: 2017-03-04

## 2017-03-04 RX ADMIN — METRONIDAZOLE 500 MG: 500 INJECTION, SOLUTION INTRAVENOUS at 09:03

## 2017-03-04 RX ADMIN — IPRATROPIUM BROMIDE AND ALBUTEROL SULFATE 3 ML: .5; 3 SOLUTION RESPIRATORY (INHALATION) at 08:03

## 2017-03-04 RX ADMIN — HEPARIN SODIUM 5000 UNITS: 5000 INJECTION, SOLUTION INTRAVENOUS; SUBCUTANEOUS at 02:03

## 2017-03-04 RX ADMIN — AMIODARONE HYDROCHLORIDE 0.5 MG/MIN: 1.8 INJECTION, SOLUTION INTRAVENOUS at 02:03

## 2017-03-04 RX ADMIN — METRONIDAZOLE 500 MG: 500 INJECTION, SOLUTION INTRAVENOUS at 02:03

## 2017-03-04 RX ADMIN — IPRATROPIUM BROMIDE AND ALBUTEROL SULFATE 3 ML: .5; 3 SOLUTION RESPIRATORY (INHALATION) at 02:03

## 2017-03-04 RX ADMIN — DIGOXIN 125 MCG: 0.25 INJECTION INTRAMUSCULAR; INTRAVENOUS at 12:03

## 2017-03-04 RX ADMIN — MOXIFLOXACIN HYDROCHLORIDE 400 MG: 400 INJECTION, SOLUTION INTRAVENOUS at 03:03

## 2017-03-04 RX ADMIN — FUROSEMIDE 80 MG: 10 INJECTION, SOLUTION INTRAMUSCULAR; INTRAVENOUS at 05:03

## 2017-03-04 RX ADMIN — AMIODARONE HYDROCHLORIDE 0.5 MG/MIN: 1.8 INJECTION, SOLUTION INTRAVENOUS at 03:03

## 2017-03-04 RX ADMIN — HEPARIN SODIUM 5000 UNITS: 5000 INJECTION, SOLUTION INTRAVENOUS; SUBCUTANEOUS at 09:03

## 2017-03-04 RX ADMIN — HEPARIN SODIUM 5000 UNITS: 5000 INJECTION, SOLUTION INTRAVENOUS; SUBCUTANEOUS at 05:03

## 2017-03-04 RX ADMIN — METRONIDAZOLE 500 MG: 500 INJECTION, SOLUTION INTRAVENOUS at 05:03

## 2017-03-04 NOTE — PT/OT/SLP PROGRESS
Speech Language Pathology  Treatment    Amy Guzman   MRN: [id number]   Admitting Diagnosis: Pneumonia of both lower lobes due to infectious organism    Diet recommendations: Solid Diet Level: NPO  Liquid Diet Level: NPO     SLP Treatment Date: 03/04/17  Speech Start Time: 0838     Speech Stop Time: 0856     Speech Total (min): 18 min       TREATMENT BILLABLE MINUTES:  Treatment Swallowing Dysfunction 18    Has the patient been evaluated by SLP for swallowing? : Yes  Keep patient NPO?: Yes   General Precautions: Standard, vision impaired, aspiration  Current Respiratory Status: nasal cannula       Subjective:  Pt alert and seen at bedside with family.        Objective:   Patient found with: peripheral IV, telemetry, oxygen   Pt coughing prior, during, and post po trials. Pt unable to produce volitional swallow. Volitional cough present. Observed po intake of ice chip X1, textured puree, and thin liquid via straw. Pt unable to manipulate oral motor musculature for a-p transit or generate swallow reflex. All presentations were removed via toothette from anterior oral cavaity. Oral care provided for hydration, sensation, and generation of spontaneous swallow reflex. Pt did not exhibit any pharyngeal movement for swallow mechanism. Recommend continue NPO.              Assessment:  Amy Guzman is a 89 y.o. female with a medical diagnosis of Pneumonia of both lower lobes due to infectious organism and presents with oropharyngeal dysphagia.    Discharge recommendations:       Goals:   SLP Goals        Problem: SLP Goal    Goal Priority Disciplines Outcome   SLP Goal     SLP    Description:  1.  Ongoing swallow assessment to determine swallow safety and po readiness                 Plan:   Patient to be seen Therapy Frequency: 2 x/week   Plan of Care expires: 03/10/17  Plan of Care reviewed with: family  SLP Follow-up?: Yes              Poonam Shah CCC-SLP  03/04/2017

## 2017-03-04 NOTE — SUBJECTIVE & OBJECTIVE
Interval History: 89 year old woman with pneumonia /sepsis.  Urine culture is now positive for enterococcus.  Resp culture is positive for MRSA.  Blood culture is positive for staph capitis.  Blood culture done on 02/28- negative   HPI:  89 year old woman - NH resident with history of dementia, HTN, HLD, OA, GERD and Dementia who was brought to ER by EMS for progressive cough and SOB for 1 week. Per [facility], patient spO2 was 60% at the nursing home which increased to 77% on 4L O2. She also now has new onset A fib.  Since admission, chest x-ray done on 02/26 showed mildly worsening left lower lobe infiltrate.  Stable right lung infiltrates.  CBC showed wbc of 19.24.  Blood culture is positive for staph capitis, respiratory culture-MRSA,urine culture -enterococcus     She had bedside swallow evaluation and po diet of puree and thin liquid was recommended.  She was seen and examined at bedside.  History taken from electronic chart.        Review of Systems   Unable to perform ROS: Dementia     Objective:     Vital Signs (Most Recent):  Temp: 97.7 °F (36.5 °C) (03/03/17 2300)  Pulse: 74 (03/03/17 2300)  Resp: (!) 22 (03/03/17 2300)  BP: (!) 152/64 (03/03/17 2300)  SpO2: 96 % (03/03/17 2300) Vital Signs (24h Range):  Temp:  [97.7 °F (36.5 °C)-98.4 °F (36.9 °C)] 97.7 °F (36.5 °C)  Pulse:  [74-99] 74  Resp:  [16-22] 22  SpO2:  [91 %-97 %] 96 %  BP: (103-152)/(54-88) 152/64     Weight: 73.9 kg (162 lb 14.7 oz)  Body mass index is 31.82 kg/(m^2).    Estimated Creatinine Clearance: 39.5 mL/min (based on Cr of 0.8).    Physical Exam   Constitutional: She appears well-developed and well-nourished. No distress.   Elderly lady, AAOx1, pleasantly demented, NAD, has ch LE stasis changes   HENT:   Head: Normocephalic and atraumatic.   Mouth/Throat: Oropharynx is clear and moist.   Dry tongue   Eyes: Conjunctivae and EOM are normal. Pupils are equal, round, and reactive to light.   Neck: Normal range of motion. Neck supple. No JVD  present.   Cardiovascular: Intact distal pulses.  Exam reveals no gallop and no friction rub.    Murmur heard.  Irregular rhythm ,tachycardia   Pulmonary/Chest: No respiratory distress. She has no wheezes. She has rales.   Obvious chest congestion upon coughing   Abdominal: Soft. Bowel sounds are normal. She exhibits no distension. There is no tenderness.   Genitourinary:   Genitourinary Comments: Deferred, howell in place   Musculoskeletal: She exhibits no tenderness or deformity.   + R knee scar, LE stasis changes B   Lymphadenopathy:     She has no cervical adenopathy.   Neurological: She is alert. She has normal reflexes.   Pleasantly disoriented, [place], speech clear   Skin: Skin is warm and dry. No rash noted. She is not diaphoretic.   Psychiatric: She has a normal mood and affect. Her behavior is normal.   Nursing note and vitals reviewed.      Significant Labs:   Blood Culture:     Recent Labs  Lab 02/26/17  1015 02/26/17  1030 02/28/17  0636 02/28/17  0643   LABBLOO Gram stain aer bottle: Gram positive cocci in clusters resembling Staph  Results called to and read back by:Ree White RN 02/27/2017    17:33  STAPHYLOCOCCUS CAPITIS Gram stain aer bottle: Gram positive cocci in clusters resembling Staph   Results called to and read back by:João Russell RN 02/28/2017    12:24  Gram stain keith bottle: Gram positive cocci in clusters resembling Staph   02/28/2017  20:43  STAPHYLOCOCCUS CAPITISSusceptibility pending Gram stain aer bottle: Gram positive cocci in clusters resembling Staph   Results called to and read back by: Didi Cox RN 03/02/2017  14:45  STAPHYLOCOCCUS AUREUSSusceptibility pending No Growth to date  No Growth to date  No Growth to date  No Growth to date     CBC:     Recent Labs  Lab 03/02/17  0446 03/03/17  0532   WBC 13.95* 12.97*   HGB 12.9 11.9*   HCT 39.2 36.3*    254     CMP:     Recent Labs  Lab 03/02/17  1644      K 4.0   *   CO2 21*   *    BUN 25   CREATININE 0.8   CALCIUM 8.4*   ANIONGAP 8   EGFRNONAA >60     Respiratory Culture:     Recent Labs  Lab 02/26/17  1644   GSRESP >10 epithelial cells per low power field  Few WBC's  Few Gram negative rods  Few Gram positive rods  Many Gram positive cocci   RESPIRATORYC METHICILLIN RESISTANT STAPHYLOCOCCUS AUREUSMany       Significant Imaging: I have reviewed all pertinent imaging results/findings within the past 24 hours.

## 2017-03-04 NOTE — NURSING
Patient with crackles in bases of lungs and coughing with moderate secretions requiring frequent suctioning. Dr. Garces notified, order received for lasix 80 mg IV once. Order placed, will continue to monitor.

## 2017-03-04 NOTE — PROGRESS NOTES
[facility]  Infectious Disease  Progress Note    Patient Name: Amy Guzman  MRN: [id number]  Admission Date: 2/26/2017  Length of Stay: 6 days  Attending Physician: Wyatt Schilling MD  Primary Care Provider: Primary Doctor No    Isolation Status: Contact  Assessment/Plan:      * Pneumonia of both lower lobes due to infectious organism  Respiratory cultures are positive for  MRstaph aureus   Blood cultures are positive for staph -  Will continue vancomycin ,avelox     Acute cystitis without hematuria  Urine culture is positive for enterococcus.Continue vancomycin     New onset a-fib  Rate control as per primary team/cardiology .    Now on amiodarone     Bacteremia   blood culture done on 02/28 is negative -continue vanco  Isolate is staph capitis       Anticipated Disposition:     Thank you for your consult. I will follow-up with patient. Please contact us if you have any additional questions.    Harinder Quinones MD  Infectious Disease  [facility]    Subjective:     Principal Problem:Pneumonia of both lower lobes due to infectious organism    Interval History: 89 year old woman with pneumonia /sepsis.  Urine culture is now positive for enterococcus.  Resp culture is positive for MRSA.  Blood culture is positive for staph capitis.  Blood culture done on 02/28- negative   HPI:  89 year old woman - NH resident with history of dementia, HTN, HLD, OA, GERD and Dementia who was brought to ER by EMS for progressive cough and SOB for 1 week. Per [facility], patient spO2 was 60% at the nursing home which increased to 77% on 4L O2. She also now has new onset A fib.  Since admission, chest x-ray done on 02/26 showed mildly worsening left lower lobe infiltrate.  Stable right lung infiltrates.  CBC showed wbc of 19.24.  Blood culture is positive for staph capitis, respiratory culture-MRSA,urine culture -enterococcus     She had bedside swallow evaluation and po diet of puree and thin liquid was  recommended.  She was seen and examined at bedside.  History taken from electronic chart.        Review of Systems   Unable to perform ROS: Dementia     Objective:     Vital Signs (Most Recent):  Temp: 97.7 °F (36.5 °C) (03/03/17 2300)  Pulse: 74 (03/03/17 2300)  Resp: (!) 22 (03/03/17 2300)  BP: (!) 152/64 (03/03/17 2300)  SpO2: 96 % (03/03/17 2300) Vital Signs (24h Range):  Temp:  [97.7 °F (36.5 °C)-98.4 °F (36.9 °C)] 97.7 °F (36.5 °C)  Pulse:  [74-99] 74  Resp:  [16-22] 22  SpO2:  [91 %-97 %] 96 %  BP: (103-152)/(54-88) 152/64     Weight: 73.9 kg (162 lb 14.7 oz)  Body mass index is 31.82 kg/(m^2).    Estimated Creatinine Clearance: 39.5 mL/min (based on Cr of 0.8).    Physical Exam   Constitutional: She appears well-developed and well-nourished. No distress.   Elderly lady, AAOx1, pleasantly demented, NAD, has ch LE stasis changes   HENT:   Head: Normocephalic and atraumatic.   Mouth/Throat: Oropharynx is clear and moist.   Dry tongue   Eyes: Conjunctivae and EOM are normal. Pupils are equal, round, and reactive to light.   Neck: Normal range of motion. Neck supple. No JVD present.   Cardiovascular: Intact distal pulses.  Exam reveals no gallop and no friction rub.    Murmur heard.  Irregular rhythm ,tachycardia   Pulmonary/Chest: No respiratory distress. She has no wheezes. She has rales.   Obvious chest congestion upon coughing   Abdominal: Soft. Bowel sounds are normal. She exhibits no distension. There is no tenderness.   Genitourinary:   Genitourinary Comments: Deferred, howell in place   Musculoskeletal: She exhibits no tenderness or deformity.   + R knee scar, LE stasis changes B   Lymphadenopathy:     She has no cervical adenopathy.   Neurological: She is alert. She has normal reflexes.   Pleasantly disoriented, [place], speech clear   Skin: Skin is warm and dry. No rash noted. She is not diaphoretic.   Psychiatric: She has a normal mood and affect. Her behavior is normal.   Nursing note and vitals  reviewed.      Significant Labs:   Blood Culture:     Recent Labs  Lab 02/26/17  1015 02/26/17  1030 02/28/17  0636 02/28/17  0643   LABBLOO Gram stain aer bottle: Gram positive cocci in clusters resembling Staph  Results called to and read back by:Ree White RN 02/27/2017    17:33  STAPHYLOCOCCUS CAPITIS Gram stain aer bottle: Gram positive cocci in clusters resembling Staph   Results called to and read back by:João Russell RN 02/28/2017    12:24  Gram stain keith bottle: Gram positive cocci in clusters resembling Staph   02/28/2017  20:43  STAPHYLOCOCCUS CAPITISSusceptibility pending Gram stain aer bottle: Gram positive cocci in clusters resembling Staph   Results called to and read back by: Didi Cox RN 03/02/2017  14:45  STAPHYLOCOCCUS AUREUSSusceptibility pending No Growth to date  No Growth to date  No Growth to date  No Growth to date     CBC:     Recent Labs  Lab 03/02/17  0446 03/03/17  0532   WBC 13.95* 12.97*   HGB 12.9 11.9*   HCT 39.2 36.3*    254     CMP:     Recent Labs  Lab 03/02/17  1644      K 4.0   *   CO2 21*   *   BUN 25   CREATININE 0.8   CALCIUM 8.4*   ANIONGAP 8   EGFRNONAA >60     Respiratory Culture:     Recent Labs  Lab 02/26/17  1644   GSRESP >10 epithelial cells per low power field  Few WBC's  Few Gram negative rods  Few Gram positive rods  Many Gram positive cocci   RESPIRATORYC METHICILLIN RESISTANT STAPHYLOCOCCUS AUREUSMany       Significant Imaging: I have reviewed all pertinent imaging results/findings within the past 24 hours.

## 2017-03-04 NOTE — ASSESSMENT & PLAN NOTE
- MRSA positive in respiratory cultures  - On Vanc/flagyl  - Avelox dcd - discussed with Dr. Quinones  - SLP repeat 3/3/17 - pt has declined.  Case d/w [facility], who will re evaluate, but at this time pt was aspirating at all consistencies.  This is decline from original eval on 2/28/17.  If pts swallowing does not improve, she will likely need a PEG tube vs hospice.   Await family discussion today.

## 2017-03-04 NOTE — SUBJECTIVE & OBJECTIVE
Interval History: looks a little better, more alert and oriented, recognized her son, less tremulous. Remains in Afib with controlled rate.    Review of Systems   Unable to perform ROS: Dementia     Objective:     Vital Signs (Most Recent):  Temp: 98.2 °F (36.8 °C) (03/04/17 1625)  Pulse: 89 (03/04/17 1625)  Resp: 20 (03/04/17 1625)  BP: 128/63 (03/04/17 1625)  SpO2: (!) 94 % (03/04/17 1625) Vital Signs (24h Range):  Temp:  [97.7 °F (36.5 °C)-98.6 °F (37 °C)] 98.2 °F (36.8 °C)  Pulse:  [74-90] 89  Resp:  [16-22] 20  SpO2:  [92 %-97 %] 94 %  BP: (115-157)/(54-66) 128/63     Weight: 73.9 kg (162 lb 14.7 oz)  Body mass index is 31.82 kg/(m^2).    Intake/Output Summary (Last 24 hours) at 03/04/17 1747  Last data filed at 03/04/17 0600   Gross per 24 hour   Intake          1214.96 ml   Output                0 ml   Net          1214.96 ml      Physical Exam   Constitutional: She appears well-developed and well-nourished. No distress.   Elderly lady, AAOx1, pleasantly demented, NAD, has ch LE stasis changes, tremulous but a little more awake and oriented now.     HENT:   Head: Normocephalic and atraumatic.   Mouth/Throat: Oropharynx is clear and moist.   Dry tongue   Eyes: Conjunctivae and EOM are normal. Pupils are equal, round, and reactive to light.   Neck: Normal range of motion. Neck supple. No JVD present.   Cardiovascular: Intact distal pulses.  Exam reveals no gallop and no friction rub.    Murmur heard.  Irregular rhythm ,tachycardia   Pulmonary/Chest: No respiratory distress. She has no wheezes. She has rales.   Obvious chest congestion upon coughing   Abdominal: Soft. Bowel sounds are normal. She exhibits no distension. There is no tenderness.   Genitourinary:   Genitourinary Comments: Deferred, howell in place   Musculoskeletal: She exhibits no tenderness or deformity.   + R knee scar, LE stasis changes B   Lymphadenopathy:     She has no cervical adenopathy.   Neurological: She is alert. She has normal  reflexes.   Pleasantly disoriented, [place], speech clear   Skin: Skin is warm and dry. No rash noted. She is not diaphoretic.   Psychiatric: She has a normal mood and affect. Her behavior is normal.   Nursing note and vitals reviewed.      Significant Labs:     CBC:   Recent Labs  Lab 03/03/17  0532 03/04/17  0535   WBC 12.97* 13.53*   HGB 11.9* 10.9*   HCT 36.3* 34.1*    271     All pertinent labs within the past 24 hours have been reviewed.    Significant Imaging: I have reviewed all pertinent imaging results/findings within the past 24 hours.

## 2017-03-04 NOTE — PROGRESS NOTES
Cardiology Progress Note        SUBJECTIVE:     History of Present Illness:  Patient is a 89 y.o. female presents with new onset A Fib with RVR. A Fib rate now more stable on B blocker and Amiodarone drip. Currently NPO. Not a candidate for anticoagulation due to advance age and comorbidities.       OBJECTIVE:     Vital Signs (Most Recent)  Temp: 98.5 °F (36.9 °C) (03/04/17 0956)  Pulse: 82 (03/04/17 0956)  Resp: 20 (03/04/17 0956)  BP: 135/66 (03/04/17 0956)  SpO2: (!) 94 % (03/04/17 0956)    Vital Signs Range (Last 24H):  Temp:  [97.7 °F (36.5 °C)-98.5 °F (36.9 °C)]   Pulse:  [74-99]   Resp:  [16-22]   BP: (103-157)/(54-68)   SpO2:  [91 %-97 %]     Intake/Output last 3 shifts:  I/O last 3 completed shifts:  In: 2565 [P.O.:170; I.V.:1277.5; IV Piggyback:300]  Out: -     Intake/Output this shift:       Review of patient's allergies indicates:   Allergen Reactions    Lidocaine      Hallucinations    Penicillins Rash    Sulfa (sulfonamide antibiotics) Rash       Current Facility-Administered Medications   Medication    acetaminophen tablet 650 mg    albuterol-ipratropium 2.5mg-0.5mg/3mL nebulizer solution 3 mL    Amino acid 2.75% - dextrose 10% (CLINIMIX-E) solution with additives ( 1L provides 27.5 gm AA, 100 gm CHO (340 kcal/L dextrose), Na 35, K 30, Mg 5, Ca 4.5, Acetate 51, Cl 39, Phos 15)    amiodarone 360 mg/200 mL (1.8 mg/mL) infusion    aspirin chewable tablet 81 mg    atorvastatin tablet 20 mg    bisacodyl suppository 10 mg    digoxin injection 125 mcg    famotidine tablet 20 mg    haloperidol lactate injection 2 mg    heparin (porcine) injection 5,000 Units    lorazepam injection 0.5 mg    metoprolol succinate (TOPROL-XL) 24 hr tablet 25 mg    metronidazole IVPB 500 mg    moxifloxacin 400 mg/250 mL IVPB 400 mg    ondansetron injection 4 mg    vancomycin 1 g in dextrose 5 % 250 mL IVPB (ready to mix system)     No current facility-administered medications on file prior to encounter.       No current outpatient prescriptions on file prior to encounter.       Physical Exam:  General appearance: alert, appears stated age and cooperative  Head: Normocephalic, without obvious abnormality, atraumatic  Eyes:  conjunctivae/corneas clear. PERRL, EOM's intact. Fundi benign.  Nose: no discharge  Throat: normal findings: tongue midline and normal  Neck: no adenopathy, no carotid bruit, no JVD, supple, symmetrical, trachea midline and thyroid not enlarged, symmetric, no tenderness/mass/nodules  Back:  no skin lesions, erythema, or scars  Lungs:  clear to auscultation bilaterally  Chest wall: no tenderness  Heart: irregular rate and rhythm, S1, S2 normal, no murmur, click, rub or gallop  Abdomen: soft, non-tender; bowel sounds normal; no masses,  no organomegaly  Extremities: extremities normal, atraumatic, no cyanosis or edema  Pulses: + and symmetric  Skin: Skin color, texture, turgor normal. No rashes or lesions  Neurologic: Demented     Laboratory:  Chemistry:   Lab Results   Component Value Date     03/02/2017    K 4.0 03/02/2017     (H) 03/02/2017    CO2 21 (L) 03/02/2017    BUN 25 03/02/2017    CREATININE 0.8 03/02/2017    CALCIUM 8.4 (L) 03/02/2017     Cardiac Markers:   Lab Results   Component Value Date    TROPONINI 0.037 (H) 02/26/2017     Cardiac Markers (Last 3):   Lab Results   Component Value Date    TROPONINI 0.037 (H) 02/26/2017     CBC:   Lab Results   Component Value Date    WBC 13.53 (H) 03/04/2017    HGB 10.9 (L) 03/04/2017    HCT 34.1 (L) 03/04/2017    MCV 97 03/04/2017     03/04/2017     Lipids: No results found for: CHOL, TRIG, HDL, LDLDIRECT  Coagulation: No results found for: INR, APTT    Diagnostic Results:  ECG: Reviewed  X-Ray: Reviewed  US: Reviewed  CT: Reviewed  Echo: Reviewed      ASSESSMENT/PLAN:     Patient Active Problem List   Diagnosis    Pneumonia of both lower lobes due to infectious organism    Acute cystitis without hematuria    Vascular dementia  without behavioral disturbance    Swallowing difficulty    UTI (urinary tract infection) due to Enterococcus    New onset a-fib    Bacteremia    V-tach        Plan:    Will continue current medications and treatment plan  Continue Amiodarone drip for now   Dig 0.125 mg IV X 1  Continue Toprol XL, Statin and ASA when tolerating PO      Chart reviewed. Dr. Escobar agrees with plan as outlined above.

## 2017-03-04 NOTE — PLAN OF CARE
Problem: Patient Care Overview  Goal: Plan of Care Review  Outcome: Ongoing (interventions implemented as appropriate)  Amiodarone gtt infusing at 0.5ml. Clinimix infusing at 50ml. O2 at 3L. Pt remained NPO. Suctioning hooked up at bedside. Productive cough noted with clear secretions. Incontinent x2. Pt remainson contact Precautions  for Ecoli in urine.  Pt  Pt afib on monitor. urinary output WNL. Remained free of falls.  Family at bedside.Left pt bed low, call light in reach, bed alarm on

## 2017-03-04 NOTE — ASSESSMENT & PLAN NOTE
- Positive for staph capitus  - cont vanc (will need 2 weeks)  - Will need PICC line IF treatment at the NH is desired  - Spoke with 2 dtrs last two days and a grand dtr at bedside today.  Family has not decided if they would like to do hospice vs continue treatment.  They understand that if pt is being treated she will need a PICC line and IV antibx for 2 weeks at the NH.  Concerns for the pt pulling out the PICC like as she dose get confused with her underlying dementia.  Pts son is coming into town this afternoon and the family will be discussing the plan moving fwd.  All family members I have spoken with understand that she cannot have IV antibiotics on hospice and given the types of infections she has there is no PO options, in addition, pt is unable to swallow without aspirating.  If family desires continued treatment a PICC will need to be placed.  Await family meeting soon

## 2017-03-04 NOTE — PROGRESS NOTES
[facility] Hospital Medicine  Progress Note    Patient Name: Amy Guzman  MRN: [id number]  Patient Class: IP- Inpatient   Admission Date: 2/26/2017  Length of Stay: 6 days  Attending Physician: Wyatt Schilling MD  Primary Care Provider: Primary Doctor No        Subjective:     Principal Problem:Pneumonia of both lower lobes due to infectious organism    HPI:  Amy Guzman is a 89 y.o. female NH resident with a PMH of HTN, HLD, OA, GERD and Dementia who was brought to ER by EMS for progressive cough and SOB for 1 week. Per [facility], patient spO2 was 60% at the nursing home which increased to 77% on 4L O2. They report giving patient a DuoNeb treatment, Albuterol, and 125mg Solumedrol. Associated with dry cough and subjective fever. Pt denies any chills, fatigue, nausea, vomiting, CP, leg pain/swelling, and all other sx. No further complaints or concerns at this time.     Hospital Course:  In ED creatinine 1.6, PaO2 53, UA+ and CXR with bilat patchy infiltrates.  BP dropped to 88/35 in ED. Sepsis protocol started, given triple IV Abx and IVF vol resuscitation.  Admitted to ICU. No pressors needed. Initial urine cloudy and dirty but now urine clearing up. Pt did well, was transferred to floor. Her Blood and Urine Cx is growing GPC-- she is on Vanco/ Avelox. ID following. She also developed Afib with RVR treated with IV dig. She is also very restless, agitated today requiring IV haldol and Ativan. She is a DNR, family would like her to return to [facility] as Hospice now.            Interval History: looks a little better, more alert and oriented, recognized her son, less tremulous. Remains in Afib with controlled rate.    Review of Systems   Unable to perform ROS: Dementia     Objective:     Vital Signs (Most Recent):  Temp: 98.2 °F (36.8 °C) (03/04/17 1625)  Pulse: 89 (03/04/17 1625)  Resp: 20 (03/04/17 1625)  BP: 128/63 (03/04/17 1625)  SpO2: (!) 94 % (03/04/17 1625) Vital Signs (24h  Range):  Temp:  [97.7 °F (36.5 °C)-98.6 °F (37 °C)] 98.2 °F (36.8 °C)  Pulse:  [74-90] 89  Resp:  [16-22] 20  SpO2:  [92 %-97 %] 94 %  BP: (115-157)/(54-66) 128/63     Weight: 73.9 kg (162 lb 14.7 oz)  Body mass index is 31.82 kg/(m^2).    Intake/Output Summary (Last 24 hours) at 03/04/17 1747  Last data filed at 03/04/17 0600   Gross per 24 hour   Intake          1214.96 ml   Output                0 ml   Net          1214.96 ml      Physical Exam   Constitutional: She appears well-developed and well-nourished. No distress.   Elderly lady, AAOx1, pleasantly demented, NAD, has ch LE stasis changes, tremulous but a little more awake and oriented now.     HENT:   Head: Normocephalic and atraumatic.   Mouth/Throat: Oropharynx is clear and moist.   Dry tongue   Eyes: Conjunctivae and EOM are normal. Pupils are equal, round, and reactive to light.   Neck: Normal range of motion. Neck supple. No JVD present.   Cardiovascular: Intact distal pulses.  Exam reveals no gallop and no friction rub.    Murmur heard.  Irregular rhythm ,tachycardia   Pulmonary/Chest: No respiratory distress. She has no wheezes. She has rales.   Obvious chest congestion upon coughing   Abdominal: Soft. Bowel sounds are normal. She exhibits no distension. There is no tenderness.   Genitourinary:   Genitourinary Comments: Deferred, howell in place   Musculoskeletal: She exhibits no tenderness or deformity.   + R knee scar, LE stasis changes B   Lymphadenopathy:     She has no cervical adenopathy.   Neurological: She is alert. She has normal reflexes.   Pleasantly disoriented, [place], speech clear   Skin: Skin is warm and dry. No rash noted. She is not diaphoretic.   Psychiatric: She has a normal mood and affect. Her behavior is normal.   Nursing note and vitals reviewed.      Significant Labs:     CBC:   Recent Labs  Lab 03/03/17  0532 03/04/17  0535   WBC 12.97* 13.53*   HGB 11.9* 10.9*   HCT 36.3* 34.1*    271     All pertinent labs within the  past 24 hours have been reviewed.    Significant Imaging: I have reviewed all pertinent imaging results/findings within the past 24 hours.    Assessment/Plan:      * Pneumonia of both lower lobes due to infectious organism  - MRSA positive in respiratory cultures  - On Vanc/flagyl  - Avelox dcd - discussed with Dr. Quinones  - SLP repeat 3/3/17 - pt has declined.  Case d/w [facility], who will re evaluate, but at this time pt was aspirating at all consistencies.  This is decline from original eval on 2/28/17.  If pts swallowing does not improve, she will likely need a PEG tube vs hospice.   Await family discussion today.      Acute cystitis without hematuria  - Enterococcus positive  - Cont vanc  - ID on board    RICK (acute kidney injury), resolved as of 2/27/2017  Sec to Sepsis and IVVD  Rehydrate with NS.   Appears better    Vascular dementia without behavioral disturbance  Advance, NH resident, bed bound, needs total care.      Swallowing difficulty  - Repeat Eval 3/3/17 - showed aspiration at all consistency  - Currently NPO with PPN  - ST will re eval as there was a decline from 2/28/17  - Spoke with grand dtr at bedside prior to eval that if her swallowing has decompensated the family will need to decide between PEG tube and hospice.  Given her co morbities including wide spread infection and underlying dementia pt will likely not do well with a PEG tube and will likely pull it out in a state of confusion.      New onset a-fib  - cont metoprolol, ASA, Digoxin, Amiodarone  - Cardiology following    Bacteremia  - Positive for staph capitus  - cont vanc (will need 2 weeks)  - Will need PICC line IF treatment at the NH is desired  - Spoke with 2 dtrs last two days and a grand dtr at bedside today.  Family has not decided if they would like to do hospice vs continue treatment.  They understand that if pt is being treated she will need a PICC line and IV antibx for 2 weeks at the NH.  Concerns for the pt pulling out the  PICC like as she dose get confused with her underlying dementia.  Pts son is coming into town this afternoon and the family will be discussing the plan moving fwd.  All family members I have spoken with understand that she cannot have IV antibiotics on hospice and given the types of infections she has there is no PO options, in addition, pt is unable to swallow without aspirating.  If family desires continued treatment a PICC will need to be placed.  Await family meeting soon      V-tach  - Cardiology on board  - on amiodarone IV        VTE Risk Mitigation         Ordered     heparin (porcine) injection 5,000 Units  Every 8 hours     Route:  Subcutaneous        02/26/17 1505     Medium Risk of VTE  Once      02/26/17 1430     Place sequential compression device  Until discontinued      02/26/17 1430          Wyatt Schilling MD  Department of Hospital Medicine   [facility]

## 2017-03-04 NOTE — ASSESSMENT & PLAN NOTE
Respiratory cultures are positive for  MRstaph aureus   Blood cultures are positive for staph -  Will continue vancomycin ,avelox

## 2017-03-05 LAB
BACTERIA BLD CULT: NORMAL
BASOPHILS NFR BLD: 0 %
CREAT SERPL-MCNC: 0.7 MG/DL
DIFFERENTIAL METHOD: ABNORMAL
EOSINOPHIL NFR BLD: 4 %
ERYTHROCYTE [DISTWIDTH] IN BLOOD BY AUTOMATED COUNT: 14.2 %
EST. GFR  (AFRICAN AMERICAN): >60 ML/MIN/1.73 M^2
EST. GFR  (NON AFRICAN AMERICAN): >60 ML/MIN/1.73 M^2
HCT VFR BLD AUTO: 34.8 %
HGB BLD-MCNC: 11.2 G/DL
LYMPHOCYTES NFR BLD: 9 %
MCH RBC QN AUTO: 30.3 PG
MCHC RBC AUTO-ENTMCNC: 32.2 %
MCV RBC AUTO: 94 FL
METAMYELOCYTES NFR BLD MANUAL: 3 %
MONOCYTES NFR BLD: 1 %
MYELOCYTES NFR BLD MANUAL: 1 %
NEUTROPHILS NFR BLD: 81 %
NEUTS BAND NFR BLD MANUAL: 1 %
PLATELET # BLD AUTO: 244 K/UL
PLATELET BLD QL SMEAR: ABNORMAL
PMV BLD AUTO: 9.2 FL
RBC # BLD AUTO: 3.7 M/UL
VANCOMYCIN TROUGH SERPL-MCNC: 11.9 UG/ML
WBC # BLD AUTO: 12.9 K/UL

## 2017-03-05 PROCEDURE — 82565 ASSAY OF CREATININE: CPT

## 2017-03-05 PROCEDURE — 99232 SBSQ HOSP IP/OBS MODERATE 35: CPT | Mod: ,,, | Performed by: INTERNAL MEDICINE

## 2017-03-05 PROCEDURE — 25000242 PHARM REV CODE 250 ALT 637 W/ HCPCS: Performed by: NURSE PRACTITIONER

## 2017-03-05 PROCEDURE — 27000221 HC OXYGEN, UP TO 24 HOURS

## 2017-03-05 PROCEDURE — 92526 ORAL FUNCTION THERAPY: CPT

## 2017-03-05 PROCEDURE — 85027 COMPLETE CBC AUTOMATED: CPT

## 2017-03-05 PROCEDURE — 21400001 HC TELEMETRY ROOM

## 2017-03-05 PROCEDURE — 63600175 PHARM REV CODE 636 W HCPCS: Performed by: FAMILY MEDICINE

## 2017-03-05 PROCEDURE — 85007 BL SMEAR W/DIFF WBC COUNT: CPT

## 2017-03-05 PROCEDURE — 94640 AIRWAY INHALATION TREATMENT: CPT

## 2017-03-05 PROCEDURE — 36415 COLL VENOUS BLD VENIPUNCTURE: CPT

## 2017-03-05 PROCEDURE — 63600175 PHARM REV CODE 636 W HCPCS: Performed by: EMERGENCY MEDICINE

## 2017-03-05 PROCEDURE — 25000003 PHARM REV CODE 250: Performed by: EMERGENCY MEDICINE

## 2017-03-05 PROCEDURE — 63600175 PHARM REV CODE 636 W HCPCS: Performed by: INTERNAL MEDICINE

## 2017-03-05 PROCEDURE — 80202 ASSAY OF VANCOMYCIN: CPT

## 2017-03-05 RX ORDER — DIGOXIN 0.25 MG/ML
250 INJECTION INTRAMUSCULAR; INTRAVENOUS ONCE
Status: COMPLETED | OUTPATIENT
Start: 2017-03-05 | End: 2017-03-05

## 2017-03-05 RX ORDER — DIGOXIN 125 MCG
0.12 TABLET ORAL DAILY
Status: DISCONTINUED | OUTPATIENT
Start: 2017-03-05 | End: 2017-03-05

## 2017-03-05 RX ADMIN — ASCORBIC ACID, VITAMIN A PALMITATE, CHOLECALCIFEROL, THIAMINE HYDROCHLORIDE, RIBOFLAVIN-5 PHOSPHATE SODIUM, PYRIDOXINE HYDROCHLORIDE, NIACINAMIDE, DEXPANTHENOL, ALPHA-TOCOPHEROL ACETATE, VITAMIN K1, FOLIC ACID, BIOTIN, CYANOCOBALAMIN: 200; 3300; 200; 6; 3.6; 6; 40; 15; 10; 150; 600; 60; 5 INJECTION, SOLUTION INTRAVENOUS at 10:03

## 2017-03-05 RX ADMIN — IPRATROPIUM BROMIDE AND ALBUTEROL SULFATE 3 ML: .5; 3 SOLUTION RESPIRATORY (INHALATION) at 07:03

## 2017-03-05 RX ADMIN — METRONIDAZOLE 500 MG: 500 INJECTION, SOLUTION INTRAVENOUS at 06:03

## 2017-03-05 RX ADMIN — VANCOMYCIN HYDROCHLORIDE 1000 MG: 1 INJECTION, POWDER, LYOPHILIZED, FOR SOLUTION INTRAVENOUS at 10:03

## 2017-03-05 RX ADMIN — AMIODARONE HYDROCHLORIDE 0.5 MG/MIN: 1.8 INJECTION, SOLUTION INTRAVENOUS at 03:03

## 2017-03-05 RX ADMIN — IPRATROPIUM BROMIDE AND ALBUTEROL SULFATE 3 ML: .5; 3 SOLUTION RESPIRATORY (INHALATION) at 02:03

## 2017-03-05 RX ADMIN — IPRATROPIUM BROMIDE AND ALBUTEROL SULFATE 3 ML: .5; 3 SOLUTION RESPIRATORY (INHALATION) at 08:03

## 2017-03-05 RX ADMIN — METRONIDAZOLE 500 MG: 500 INJECTION, SOLUTION INTRAVENOUS at 04:03

## 2017-03-05 RX ADMIN — MOXIFLOXACIN HYDROCHLORIDE 400 MG: 400 INJECTION, SOLUTION INTRAVENOUS at 03:03

## 2017-03-05 RX ADMIN — DIGOXIN 250 MCG: 0.25 INJECTION INTRAMUSCULAR; INTRAVENOUS at 04:03

## 2017-03-05 NOTE — PROGRESS NOTES
Cardiology Progress Note        SUBJECTIVE:     History of Present Illness:  Patient is a 89 y.o. female presents with A Fib with RVR and pneumonia. A Fib rate now well controled. No complaint of chest pain or shortness of breath, however she is continuing to experience a cough. She continues to be NPO.       OBJECTIVE:     Vital Signs (Most Recent)  Temp: 98.8 °F (37.1 °C) (03/05/17 0815)  Pulse: 98 (03/05/17 0815)  Resp: 18 (03/05/17 0815)  BP: (!) 158/80 (03/05/17 0815)  SpO2: 96 % (03/05/17 0815)    Vital Signs Range (Last 24H):  Temp:  [97.9 °F (36.6 °C)-98.8 °F (37.1 °C)]   Pulse:  [80-98]   Resp:  [16-28]   BP: (115-158)/(50-80)   SpO2:  [92 %-98 %]     Intake/Output last 3 shifts:  I/O last 3 completed shifts:  In: 3199 [I.V.:599; IV Piggyback:800]  Out: -     Intake/Output this shift:       Review of patient's allergies indicates:   Allergen Reactions    Lidocaine      Hallucinations    Penicillins Rash    Sulfa (sulfonamide antibiotics) Rash       Current Facility-Administered Medications   Medication    acetaminophen tablet 650 mg    albuterol-ipratropium 2.5mg-0.5mg/3mL nebulizer solution 3 mL    Amino acid 2.75% - dextrose 10% (CLINIMIX-E) solution with additives ( 1L provides 27.5 gm AA, 100 gm CHO (340 kcal/L dextrose), Na 35, K 30, Mg 5, Ca 4.5, Acetate 51, Cl 39, Phos 15)    amiodarone 360 mg/200 mL (1.8 mg/mL) infusion    aspirin chewable tablet 81 mg    atorvastatin tablet 20 mg    bisacodyl suppository 10 mg    famotidine tablet 20 mg    haloperidol lactate injection 2 mg    heparin (porcine) injection 5,000 Units    lorazepam injection 0.5 mg    metoprolol succinate (TOPROL-XL) 24 hr tablet 25 mg    metronidazole IVPB 500 mg    moxifloxacin 400 mg/250 mL IVPB 400 mg    ondansetron injection 4 mg    vancomycin 1 g in dextrose 5 % 250 mL IVPB (ready to mix system)     No current facility-administered medications on file prior to encounter.      No current outpatient  prescriptions on file prior to encounter.       Physical Exam:  General appearance: alert, appears stated age and cooperative  Head: Normocephalic, without obvious abnormality, atraumatic  Eyes:  conjunctivae/corneas clear. PERRL, EOM's intact. Fundi benign.  Nose: no discharge  Throat: normal findings: tongue midline and normal  Neck: no adenopathy, no carotid bruit, no JVD, supple, symmetrical, trachea midline and thyroid not enlarged, symmetric, no tenderness/mass/nodules  Back:  no skin lesions, erythema, or scars  Lungs:  clear to auscultation bilaterally  Chest wall: no tenderness  Heart: irregular rate and rhythm, S1, S2 normal, no murmur, click, rub or gallop  Abdomen: soft, non-tender; bowel sounds normal; no masses,  no organomegaly  Extremities: extremities normal, atraumatic, no cyanosis or edema  Pulses: 1+ and symmetric  Skin: Skin color, texture, turgor normal. No rashes or lesions  Neurologic: Grossly normal    Laboratory:  Chemistry:   Lab Results   Component Value Date     03/02/2017    K 4.0 03/02/2017     (H) 03/02/2017    CO2 21 (L) 03/02/2017    BUN 25 03/02/2017    CREATININE 0.7 03/05/2017    CALCIUM 8.4 (L) 03/02/2017     Cardiac Markers:   Lab Results   Component Value Date    TROPONINI 0.037 (H) 02/26/2017     Cardiac Markers (Last 3):   Lab Results   Component Value Date    TROPONINI 0.037 (H) 02/26/2017     CBC:   Lab Results   Component Value Date    WBC 12.90 (H) 03/05/2017    HGB 11.2 (L) 03/05/2017    HCT 34.8 (L) 03/05/2017    MCV 94 03/05/2017     03/05/2017     Lipids: No results found for: CHOL, TRIG, HDL, LDLDIRECT  Coagulation: No results found for: INR, APTT    Diagnostic Results:  ECG: Reviewed  X-Ray: Reviewed  US: Reviewed  CT: Reviewed  Echo: Reviewed      ASSESSMENT/PLAN:     Patient Active Problem List   Diagnosis    Pneumonia of both lower lobes due to infectious organism    Acute cystitis without hematuria    Vascular dementia without behavioral  disturbance    Swallowing difficulty    New onset a-fib    Bacteremia    V-tach        Plan:     Will continue current medications and treatment plan  Continue Amiodarone drip for now   Continue Toprol XL, Statin and ASA when tolerating PO  Not ideal candidate for anticoagulation due to advance age and comorbidities.       Chart reviewed. Dr. Escobar agrees with plan as outlined above.

## 2017-03-05 NOTE — ASSESSMENT & PLAN NOTE
- MRSA positive in respiratory cultures, Enterococcus in UTI  - On Vanc-- flagyl also d/issa  - Avelox dcd - discussed with Dr. Quinones  - SLP repeat 3/3/17 - pt has declined.  Case d/w [facility], who will re evaluate, but at this time pt was aspirating at all consistencies.  This is decline from original eval on 2/28/17.  If pts swallowing does not improve, she will likely need a PEG tube vs hospice.   Family desires hospice but await last son's arrival tomorrow

## 2017-03-05 NOTE — PLAN OF CARE
Problem: Patient Care Overview  Goal: Plan of Care Review  Outcome: Ongoing (interventions implemented as appropriate)  Pt d/c heparin injections per family request, one time dose of digoxin IV, d/c amiodarone continuous infusion, turn q 2 hours, clinimix 50 ml/hr, contact precautions continued, family present at bedside for communication and education, possible placement at [facility].

## 2017-03-05 NOTE — PROGRESS NOTES
[facility]  Infectious Disease  Progress Note    Patient Name: Amy Guzman  MRN: [id number]  Admission Date: 2/26/2017  Length of Stay: 7 days  Attending Physician: Wyatt Schilling MD  Primary Care Provider: Primary Doctor No    Isolation Status: Contact  Assessment/Plan:      * Pneumonia of both lower lobes due to infectious organism  Respiratory cultures are positive for  MRstaph aureus   Blood cultures are positive for staph  Capitis   Will continue vancomycin ,avelox and will plan to complete 2 weeks of therapy .    Day 7/14 of therapy .    Acute cystitis without hematuria  Urine culture is positive for enterococcus.Continue vancomycin  Treated      Swallowing difficulty  NPO for now due to dysphagia .  Follow speech therapy .    New onset a-fib  Rate control as per primary team/cardiology .  Cardiology follow up       Anticipated Disposition:     Thank you for your consult. I will follow-up with patient. Please contact us if you have any additional questions.    Harinder Quinones MD  Infectious Disease  [facility]    Subjective:     Principal Problem:Pneumonia of both lower lobes due to infectious organism    Interval History: 89 year old woman with pneumonia /sepsis.  Urine culture is now positive for enterococcus.  Resp culture is positive for MRSA.  Blood culture is positive for staph capitis.  Blood culture done on 02/28- negative   She is afebrile.  HPI:  89 year old woman - NH resident with history of dementia, HTN, HLD, OA, GERD and Dementia who was brought to ER by EMS for progressive cough and SOB for 1 week. Per [facility], patient spO2 was 60% at the nursing home which increased to 77% on 4L O2. She also now has new onset A fib.  Since admission, chest x-ray done on 02/26 showed mildly worsening left lower lobe infiltrate.  Stable right lung infiltrates.  CBC showed wbc of 19.24.  Blood culture is positive for staph capitis, respiratory culture-MRSA,urine culture -enterococcus      She had bedside swallow evaluation and po diet of puree and thin liquid was recommended.  She was seen and examined at bedside.  History taken from electronic chart.        Review of Systems   Unable to perform ROS: Dementia     Objective:     Vital Signs (Most Recent):  Temp: 98.8 °F (37.1 °C) (03/05/17 0815)  Pulse: 98 (03/05/17 0815)  Resp: 18 (03/05/17 0815)  BP: (!) 158/80 (03/05/17 0815)  SpO2: 96 % (03/05/17 0815) Vital Signs (24h Range):  Temp:  [97.9 °F (36.6 °C)-98.8 °F (37.1 °C)] 98.8 °F (37.1 °C)  Pulse:  [80-98] 98  Resp:  [16-28] 18  SpO2:  [92 %-98 %] 96 %  BP: (115-158)/(50-80) 158/80     Weight: 73.9 kg (162 lb 14.7 oz)  Body mass index is 31.82 kg/(m^2).    Estimated Creatinine Clearance: 39.5 mL/min (based on Cr of 0.8).    Physical Exam   Constitutional: She appears well-developed and well-nourished. No distress.   Elderly lady, AAOx1, pleasantly demented, NAD, has ch LE stasis changes   HENT:   Head: Normocephalic and atraumatic.   Mouth/Throat: Oropharynx is clear and moist.   Dry tongue   Eyes: Conjunctivae and EOM are normal. Pupils are equal, round, and reactive to light.   Neck: Normal range of motion. Neck supple. No JVD present.   Cardiovascular: Intact distal pulses.  Exam reveals no gallop and no friction rub.    Murmur heard.  Irregular rhythm ,tachycardia   Pulmonary/Chest: No respiratory distress. She has no wheezes. She has rales.   Obvious chest congestion upon coughing   Abdominal: Soft. Bowel sounds are normal. She exhibits no distension. There is no tenderness.   Genitourinary:   Genitourinary Comments: Deferred, howell in place   Musculoskeletal: She exhibits no tenderness or deformity.   + R knee scar, LE stasis changes B   Lymphadenopathy:     She has no cervical adenopathy.   Neurological: She is alert. She has normal reflexes.   Pleasantly disoriented, [place], speech clear   Skin: Skin is warm and dry. No rash noted. She is not diaphoretic.   Psychiatric: She has a normal  mood and affect. Her behavior is normal.   Nursing note and vitals reviewed.      Significant Labs:   Blood Culture:     Recent Labs  Lab 02/26/17  1015 02/26/17  1030 02/28/17  0636 02/28/17  0643   LABBLOO Gram stain aer bottle: Gram positive cocci in clusters resembling Staph  Results called to and read back by:Ree White RN 02/27/2017    17:33  STAPHYLOCOCCUS CAPITIS Gram stain aer bottle: Gram positive cocci in clusters resembling Staph   Results called to and read back by:João Russell RN 02/28/2017    12:24  Gram stain keith bottle: Gram positive cocci in clusters resembling Staph   02/28/2017  20:43  STAPHYLOCOCCUS CAPITIS Gram stain aer bottle: Gram positive cocci in clusters resembling Staph   Results called to and read back by: Didi Cox RN 03/02/2017  14:45  METHICILLIN RESISTANT STAPHYLOCOCCUS AUREUS No Growth to date  No Growth to date  No Growth to date  No Growth to date  No Growth to date     CBC:     Recent Labs  Lab 03/04/17  0535   WBC 13.53*   HGB 10.9*   HCT 34.1*        CMP:   No results for input(s): NA, K, CL, CO2, GLU, BUN, CREATININE, CALCIUM, PROT, ALBUMIN, BILITOT, ALKPHOS, AST, ALT, ANIONGAP, EGFRNONAA in the last 48 hours.    Invalid input(s): ESTGFAFR[place]  Respiratory Culture:     Recent Labs  Lab 02/26/17  1644   GSRESP >10 epithelial cells per low power field  Few WBC's  Few Gram negative rods  Few Gram positive rods  Many Gram positive cocci   RESPIRATORYC METHICILLIN RESISTANT STAPHYLOCOCCUS AUREUSMany       Significant Imaging: I have reviewed all pertinent imaging results/findings within the past 24 hours.

## 2017-03-05 NOTE — PROGRESS NOTES
Vancomycin Progress Notes:    Trough = 11.9 @ 0830 this am  MRSA /staph capitis in blood/respiratory-vancomycin sensitive  Wbc = 12.9  Tmax = 98.8  Scr = 0.8 from 3/2 ; no labs today-will order scr for today  Before making any adjustments to issa/[facility] 3/5/2017 10:05 AM

## 2017-03-05 NOTE — PT/OT/SLP PROGRESS
Speech Language Pathology  Treatment    Amy Guzman   MRN: [id number]   Admitting Diagnosis: Pneumonia of both lower lobes due to infectious organism    Diet recommendations: Solid Diet Level: NPO  Liquid Diet Level: NPO     SLP Treatment Date: 03/05/17  Speech Start Time: 0855     Speech Stop Time: 0911     Speech Total (min): 16 min       TREATMENT BILLABLE MINUTES:  Treatment Swallowing Dysfunction 16    Has the patient been evaluated by SLP for swallowing? : Yes  Keep patient NPO?: Yes   General Precautions: Standard, aspiration  Current Respiratory Status: nasal cannula       Subjective:  Pt alert, cooperative, and confused.              Objective:   Patient found with: peripheral IV, telemetry  Pt with increase in coarse wet coughing from 3/4/17. Oral care provided for hydration, sensation, and generation of spontaneous swallow reflex. Observed X1 ice chip which was removed due to lack of a-p transit, labial leakage, and zero initiation of swallow mechanism. Pt suctioned appropriately after to ensure further removal any liquids. Pt still not appropriate for po trials at this time.  Family at bedside and educated on recommendation.               Assessment:  Amy Guzman is a 89 y.o. female with a medical diagnosis of Pneumonia of both lower lobes due to infectious organism and presents with oropharyngeal dysphagia.    Discharge recommendations:       Goals:   SLP Goals        Problem: SLP Goal    Goal Priority Disciplines Outcome   SLP Goal     SLP    Description:  1.  Ongoing swallow assessment to determine swallow safety and po readiness                 Plan:   Patient to be seen Therapy Frequency: 3 x/week   Plan of Care expires: 03/10/17  Plan of Care reviewed with: patient, family  SLP Follow-up?: Yes              Poonam Shah CCC-SLP  03/05/2017

## 2017-03-05 NOTE — PLAN OF CARE
Problem: Patient Care Overview  Goal: Plan of Care Review  Outcome: Ongoing (interventions implemented as appropriate)  Patient only oriented to self.  Vitals stable.  No complaints of pain. Amiodarone drip maintained.  TPN maintained. IV abx administered as ordered.  Suction at bedside.  Patient still has productive sputum.  Bed alarm activated   No falls on shift.  [place] encouraged

## 2017-03-05 NOTE — SUBJECTIVE & OBJECTIVE
Interval History: 89 year old woman with pneumonia /sepsis.  Urine culture is now positive for enterococcus.  Resp culture is positive for MRSA.  Blood culture is positive for staph capitis.  Blood culture done on 02/28- negative   She is afebrile.  HPI:  89 year old woman - NH resident with history of dementia, HTN, HLD, OA, GERD and Dementia who was brought to ER by EMS for progressive cough and SOB for 1 week. Per [facility], patient spO2 was 60% at the nursing home which increased to 77% on 4L O2. She also now has new onset A fib.  Since admission, chest x-ray done on 02/26 showed mildly worsening left lower lobe infiltrate.  Stable right lung infiltrates.  CBC showed wbc of 19.24.  Blood culture is positive for staph capitis, respiratory culture-MRSA,urine culture -enterococcus     She had bedside swallow evaluation and po diet of puree and thin liquid was recommended.  She was seen and examined at bedside.  History taken from electronic chart.        Review of Systems   Unable to perform ROS: Dementia     Objective:     Vital Signs (Most Recent):  Temp: 98.8 °F (37.1 °C) (03/05/17 0815)  Pulse: 98 (03/05/17 0815)  Resp: 18 (03/05/17 0815)  BP: (!) 158/80 (03/05/17 0815)  SpO2: 96 % (03/05/17 0815) Vital Signs (24h Range):  Temp:  [97.9 °F (36.6 °C)-98.8 °F (37.1 °C)] 98.8 °F (37.1 °C)  Pulse:  [80-98] 98  Resp:  [16-28] 18  SpO2:  [92 %-98 %] 96 %  BP: (115-158)/(50-80) 158/80     Weight: 73.9 kg (162 lb 14.7 oz)  Body mass index is 31.82 kg/(m^2).    Estimated Creatinine Clearance: 39.5 mL/min (based on Cr of 0.8).    Physical Exam   Constitutional: She appears well-developed and well-nourished. No distress.   Elderly lady, AAOx1, pleasantly demented, NAD, has ch LE stasis changes   HENT:   Head: Normocephalic and atraumatic.   Mouth/Throat: Oropharynx is clear and moist.   Dry tongue   Eyes: Conjunctivae and EOM are normal. Pupils are equal, round, and reactive to light.   Neck: Normal range of motion. Neck  supple. No JVD present.   Cardiovascular: Intact distal pulses.  Exam reveals no gallop and no friction rub.    Murmur heard.  Irregular rhythm ,tachycardia   Pulmonary/Chest: No respiratory distress. She has no wheezes. She has rales.   Obvious chest congestion upon coughing   Abdominal: Soft. Bowel sounds are normal. She exhibits no distension. There is no tenderness.   Genitourinary:   Genitourinary Comments: Deferred, howell in place   Musculoskeletal: She exhibits no tenderness or deformity.   + R knee scar, LE stasis changes B   Lymphadenopathy:     She has no cervical adenopathy.   Neurological: She is alert. She has normal reflexes.   Pleasantly disoriented, [place], speech clear   Skin: Skin is warm and dry. No rash noted. She is not diaphoretic.   Psychiatric: She has a normal mood and affect. Her behavior is normal.   Nursing note and vitals reviewed.      Significant Labs:   Blood Culture:     Recent Labs  Lab 02/26/17  1015 02/26/17  1030 02/28/17  0636 02/28/17  0643   LABBLOO Gram stain aer bottle: Gram positive cocci in clusters resembling Staph  Results called to and read back by:Ree White RN 02/27/2017    17:33  STAPHYLOCOCCUS CAPITIS Gram stain aer bottle: Gram positive cocci in clusters resembling Staph   Results called to and read back by:João Russell RN 02/28/2017    12:24  Gram stain keith bottle: Gram positive cocci in clusters resembling Staph   02/28/2017  20:43  STAPHYLOCOCCUS CAPITIS Gram stain aer bottle: Gram positive cocci in clusters resembling Staph   Results called to and read back by: Didi Cox RN 03/02/2017  14:45  METHICILLIN RESISTANT STAPHYLOCOCCUS AUREUS No Growth to date  No Growth to date  No Growth to date  No Growth to date  No Growth to date     CBC:     Recent Labs  Lab 03/04/17  0535   WBC 13.53*   HGB 10.9*   HCT 34.1*        CMP:   No results for input(s): NA, K, CL, CO2, GLU, BUN, CREATININE, CALCIUM, PROT, ALBUMIN, BILITOT, ALKPHOS,  AST, ALT, ANIONGAP, EGFRNONAA in the last 48 hours.    Invalid input(s): ESTGFAFR[place]  Respiratory Culture:     Recent Labs  Lab 02/26/17  1644   GSRESP >10 epithelial cells per low power field  Few WBC's  Few Gram negative rods  Few Gram positive rods  Many Gram positive cocci   RESPIRATORYC METHICILLIN RESISTANT STAPHYLOCOCCUS AUREUSMany       Significant Imaging: I have reviewed all pertinent imaging results/findings within the past 24 hours.

## 2017-03-05 NOTE — PROGRESS NOTES
[facility] Hospital Medicine  Progress Note    Patient Name: Amy Guzman  MRN: [id number]  Patient Class: IP- Inpatient   Admission Date: 2/26/2017  Length of Stay: 7 days  Attending Physician: Wyatt Schilling MD  Primary Care Provider: Primary Doctor No        Subjective:     Principal Problem:Pneumonia of both lower lobes due to infectious organism    HPI:  Amy Guzman is a 89 y.o. female NH resident with a PMH of HTN, HLD, OA, GERD and Dementia who was brought to ER by EMS for progressive cough and SOB for 1 week. Per [facility], patient spO2 was 60% at the nursing home which increased to 77% on 4L O2. They report giving patient a DuoNeb treatment, Albuterol, and 125mg Solumedrol. Associated with dry cough and subjective fever. Pt denies any chills, fatigue, nausea, vomiting, CP, leg pain/swelling, and all other sx. No further complaints or concerns at this time.     Hospital Course:  In ED creatinine 1.6, PaO2 53, UA+ and CXR with bilat patchy infiltrates.  BP dropped to 88/35 in ED. Sepsis protocol started, given triple IV Abx and IVF vol resuscitation.  Admitted to ICU. No pressors needed. Initial urine cloudy and dirty but now urine clearing up. Pt did well, was transferred to floor. Her Blood and Urine Cx is growing MRSA and Enterococcus and she is on Vanco/ Avelox and Flagyl. ID following. She also developed Afib with RVR treated with IV dig. She is also very restless, agitated today requiring IV haldol and Ativan. She is a DNR, family would like her to return to [facility] with Hospice.  She developed confusion and restlessness but it is improving a bit, however she failed Swallow test and is NPO on PPN, has obvious loud chest congestion.            Interval History: looks a little better, more alert and recognizes her family members, NPO due to aspiration, on CLinimix. Has bruises over arms, family wants to stop heparin SQ. All Cx growing MRSA and Enterococcus-- hence Flagyl and Avelox  stopped. Await last son to arrive tomorrow morning from [place].     Review of Systems   Unable to perform ROS: Dementia     Objective:     Vital Signs (Most Recent):  Temp: 98.9 °F (37.2 °C) (03/05/17 1607)  Pulse: 90 (03/05/17 1607)  Resp: 20 (03/05/17 1607)  BP: (!) 149/70 (03/05/17 1607)  SpO2: 97 % (03/05/17 1607) Vital Signs (24h Range):  Temp:  [97.9 °F (36.6 °C)-98.9 °F (37.2 °C)] 98.9 °F (37.2 °C)  Pulse:  [84-98] 90  Resp:  [16-28] 20  SpO2:  [95 %-98 %] 97 %  BP: (125-158)/(50-80) 149/70     Weight: 73.9 kg (162 lb 14.7 oz)  Body mass index is 31.82 kg/(m^2).    Intake/Output Summary (Last 24 hours) at 03/05/17 1718  Last data filed at 03/05/17 0601   Gross per 24 hour   Intake          2114.87 ml   Output                0 ml   Net          2114.87 ml      Physical Exam   Constitutional: She appears well-developed and well-nourished. No distress.   Elderly lady, AAOx2, pleasantly demented, NAD, has ch LE stasis changes   HENT:   Head: Normocephalic and atraumatic.   Mouth/Throat: Oropharynx is clear and moist.   Dry tongue   Eyes: Conjunctivae and EOM are normal. Pupils are equal, round, and reactive to light.   Neck: Normal range of motion. Neck supple. No JVD present.   Cardiovascular: Intact distal pulses.  Exam reveals no gallop and no friction rub.    Murmur heard.  Irregular rhythm, tachycardia   Pulmonary/Chest: No respiratory distress. She has no wheezes. She has rales.   Obvious chest congestion upon coughing   Abdominal: Soft. Bowel sounds are normal. She exhibits no distension. There is no tenderness.   Genitourinary:   Genitourinary Comments: Deferred, howell in place   Musculoskeletal: Normal range of motion. She exhibits no tenderness or deformity.   + R knee scar, LE stasis changes B   Lymphadenopathy:     She has no cervical adenopathy.   Neurological: She is alert. She has normal reflexes.   Pleasantly disoriented, [place], speech clear   Skin: Skin is warm and dry. No rash noted. She is not  diaphoretic.   Psychiatric: She has a normal mood and affect. Her behavior is normal.   Nursing note and vitals reviewed.      Significant Labs:   BMP:   Recent Labs  Lab 03/05/17  0831   CREATININE 0.7     CBC:   Recent Labs  Lab 03/04/17  0535 03/05/17  0831   WBC 13.53* 12.90*   HGB 10.9* 11.2*   HCT 34.1* 34.8*    244       All pertinent labs within the past 24 hours have been reviewed.    Significant Imaging: I have reviewed all pertinent imaging results/findings within the past 24 hours.    Assessment/Plan:      * Pneumonia of both lower lobes due to infectious organism  - MRSA positive in respiratory cultures, Enterococcus in UTI  - On Vanc-- flagyl also d/issa  - Avelox dcd - discussed with Dr. Quinones  - SLP repeat 3/3/17 - pt has declined.  Case d/w [facility], who will re evaluate, but at this time pt was aspirating at all consistencies.  This is decline from original eval on 2/28/17.  If pts swallowing does not improve, she will likely need a PEG tube vs hospice.   Family desires hospice but await last son's arrival tomorrow      Acute cystitis without hematuria  - Enterococcus positive  - Cont vanc  - ID on board    RICK (acute kidney injury), resolved as of 2/27/2017  Sec to Sepsis and IVVD  Rehydrate with Clinimix.   Appears better    Swallowing difficulty  - Repeat Eval 3/3/17 - showed aspiration at all consistency  - Currently NPO with PPN  - ST will re eval as there was a decline from 2/28/17  - Spoke with grand dtr at bedside prior to eval that if her swallowing has decompensated the family will need to decide between PEG tube and hospice.  Given her co morbities including wide spread infection and underlying dementia pt will likely not do well with a PEG tube and will likely pull it out in a state of confusion.      New onset a-fib  Remains in Afib with controlled rate on IV Dig  All oral meds-- metoprolol, ASA, Amiodarone on hold  - Cardiology following    Bacteremia  - Positive for staph  capitus  - cont vanc (will need 2 weeks)  - Will need PICC line IF treatment at the NH is desired  - Spoke with 2 dtrs last two days and a grand dtr at bedside today.  Family has not decided if they would like to do hospice vs continue treatment.  They understand that if pt is being treated she will need a PICC line and IV antibx for 2 weeks at the NH.  Concerns for the pt pulling out the PICC like as she dose get confused with her underlying dementia.  Pts son is coming into town this afternoon and the family will be discussing the plan moving fwd.  All family members I have spoken with understand that she cannot have IV antibiotics on hospice and given the types of infections she has there is no PO options, in addition, pt is unable to swallow without aspirating.  If family desires continued treatment a PICC will need to be placed.  Await family meeting soon      V-tach  - Cardiology on board  - on amiodarone IV        VTE Risk Mitigation         Ordered     Medium Risk of VTE  Once      02/26/17 1430     Place sequential compression device  Until discontinued      02/26/17 1430          Wyatt Schilling MD  Department of Hospital Medicine   [facility]

## 2017-03-05 NOTE — ASSESSMENT & PLAN NOTE
Respiratory cultures are positive for  MRstaph aureus   Blood cultures are positive for staph  Capitis   Will continue vancomycin ,avelox and will plan to complete 2 weeks of therapy .    Day 7/14 of therapy .

## 2017-03-05 NOTE — ASSESSMENT & PLAN NOTE
Remains in Afib with controlled rate on IV Dig  All oral meds-- metoprolol, ASA, Amiodarone on hold  - Cardiology following

## 2017-03-05 NOTE — PROGRESS NOTES
Dr Schilling notified of blood tinge urine during change of brief x two episodes, no new orders given.

## 2017-03-05 NOTE — PLAN OF CARE
Problem: Patient Care Overview  Goal: Plan of Care Review  Outcome: Ongoing (interventions implemented as appropriate)  Pt continue on antibiotics, flagyl, heparin SQ, amiodarone continuous IV, fall precautions, infection disease consult, monitor suctioning of excessive secretions, ronakker at bedside, contact precautions continued, clinimix at 50 ml/hr for nutrition, turn q 2hr, NPO d/t unable to swallow successfully.

## 2017-03-06 VITALS
HEIGHT: 60 IN | HEART RATE: 84 BPM | TEMPERATURE: 98 F | DIASTOLIC BLOOD PRESSURE: 54 MMHG | WEIGHT: 162.94 LBS | RESPIRATION RATE: 18 BRPM | BODY MASS INDEX: 31.99 KG/M2 | SYSTOLIC BLOOD PRESSURE: 127 MMHG | OXYGEN SATURATION: 96 %

## 2017-03-06 PROBLEM — I47.20 V-TACH: Status: RESOLVED | Noted: 2017-03-02 | Resolved: 2017-03-06

## 2017-03-06 PROBLEM — J96.91 RESPIRATORY FAILURE WITH HYPOXIA: Status: ACTIVE | Noted: 2017-03-06

## 2017-03-06 LAB
BASOPHILS # BLD AUTO: 0.03 K/UL
BASOPHILS NFR BLD: 0.2 %
DIFFERENTIAL METHOD: ABNORMAL
EOSINOPHIL # BLD AUTO: 0.5 K/UL
EOSINOPHIL NFR BLD: 3.7 %
ERYTHROCYTE [DISTWIDTH] IN BLOOD BY AUTOMATED COUNT: 14.5 %
HCT VFR BLD AUTO: 33 %
HGB BLD-MCNC: 10.5 G/DL
LYMPHOCYTES # BLD AUTO: 1.4 K/UL
LYMPHOCYTES NFR BLD: 11.3 %
MCH RBC QN AUTO: 30.3 PG
MCHC RBC AUTO-ENTMCNC: 31.8 %
MCV RBC AUTO: 95 FL
MONOCYTES # BLD AUTO: 1 K/UL
MONOCYTES NFR BLD: 7.6 %
NEUTROPHILS # BLD AUTO: 9.7 K/UL
NEUTROPHILS NFR BLD: 77.2 %
PLATELET # BLD AUTO: 250 K/UL
PMV BLD AUTO: 9.5 FL
RBC # BLD AUTO: 3.46 M/UL
WBC # BLD AUTO: 12.58 K/UL

## 2017-03-06 PROCEDURE — 27100171 HC OXYGEN HIGH FLOW UP TO 24 HOURS

## 2017-03-06 PROCEDURE — 25000242 PHARM REV CODE 250 ALT 637 W/ HCPCS: Performed by: NURSE PRACTITIONER

## 2017-03-06 PROCEDURE — 94640 AIRWAY INHALATION TREATMENT: CPT

## 2017-03-06 PROCEDURE — 85025 COMPLETE CBC W/AUTO DIFF WBC: CPT

## 2017-03-06 PROCEDURE — 36415 COLL VENOUS BLD VENIPUNCTURE: CPT

## 2017-03-06 PROCEDURE — 94761 N-INVAS EAR/PLS OXIMETRY MLT: CPT

## 2017-03-06 PROCEDURE — 25000003 PHARM REV CODE 250: Performed by: EMERGENCY MEDICINE

## 2017-03-06 PROCEDURE — 27000221 HC OXYGEN, UP TO 24 HOURS

## 2017-03-06 PROCEDURE — 63600175 PHARM REV CODE 636 W HCPCS: Performed by: EMERGENCY MEDICINE

## 2017-03-06 RX ORDER — MINOCYCLINE HYDROCHLORIDE 100 MG/1
100 TABLET ORAL EVERY 12 HOURS
Qty: 20 TABLET | Refills: 0 | Status: SHIPPED | OUTPATIENT
Start: 2017-03-06

## 2017-03-06 RX ORDER — IPRATROPIUM BROMIDE AND ALBUTEROL SULFATE 2.5; .5 MG/3ML; MG/3ML
3 SOLUTION RESPIRATORY (INHALATION)
Qty: 10 VIAL | Refills: 1 | Status: SHIPPED | OUTPATIENT
Start: 2017-03-06 | End: 2018-03-06

## 2017-03-06 RX ORDER — BISACODYL 10 MG
10 SUPPOSITORY, RECTAL RECTAL DAILY PRN
Refills: 0 | COMMUNITY
Start: 2017-03-06

## 2017-03-06 RX ADMIN — VANCOMYCIN HYDROCHLORIDE 750 MG: 1 INJECTION, POWDER, LYOPHILIZED, FOR SOLUTION INTRAVENOUS at 11:03

## 2017-03-06 RX ADMIN — IPRATROPIUM BROMIDE AND ALBUTEROL SULFATE 3 ML: .5; 3 SOLUTION RESPIRATORY (INHALATION) at 01:03

## 2017-03-06 RX ADMIN — IPRATROPIUM BROMIDE AND ALBUTEROL SULFATE 3 ML: .5; 3 SOLUTION RESPIRATORY (INHALATION) at 08:03

## 2017-03-06 NOTE — PROGRESS NOTES
IV and telemetry monitor removed. Awaiting transport via ambulance to [facility]. Family updated on plan of care.

## 2017-03-06 NOTE — DISCHARGE SUMMARY
[facility] Hospital Medicine  Discharge Summary      Patient Name: Amy Guzman  MRN: [id number]  Admission Date: 2/26/2017  Hospital Length of Stay: 8 days  Discharge Date and Time:  03/06/2017 11:13 AM  Attending Physician: Wyatt Schilling MD   Discharging Provider: Wyatt Schilling MD  Primary Care Provider: Primary Doctor No      HPI:   Amy Guzman is a 89 y.o. female NH resident with a PMH of HTN, HLD, OA, GERD and Dementia who was brought to ER by EMS for progressive cough and SOB for 1 week. Per [facility], patient spO2 was 60% at the nursing home which increased to 77% on 4L O2. They report giving patient a DuoNeb treatment, Albuterol, and 125mg Solumedrol. Associated with dry cough and subjective fever. Pt denies any chills, fatigue, nausea, vomiting, CP, leg pain/swelling, and all other sx. No further complaints or concerns at this time.     * No surgery found *      Indwelling Lines/Drains at time of discharge:   Lines/Drains/Airways          No matching active lines, drains, or airways        Hospital Course:   In ED creatinine 1.6, PaO2 53, UA+ and CXR with bilat patchy infiltrates.  BP dropped to 88/35 in ED. Sepsis protocol started, given triple IV Abx and IVF for volume resuscitation. Admitted to ICU. No pressors needed. Initial urine cloudy and dirty but later urine cleared up. Pt did well, was transferred to floor. Her Blood and Urine Cx grew MRSA and Enterococcus and she is on Vanco/ Avelox and Flagyl. ID following. She also developed Afib with RVR treated with IV Dig and Amiodarone, Cardiology consulted.     However she became very restless, agitated requiring IV Haldol and Ativan. She is a DNR, family would like her to return to [facility] with Hospice. Her confusion and restlessness improved a bit and she became more alert awake and oriented, however continued to have severe chest congestion and she failed Swallow test and was kept NPO and started on PPN with clinimix.     We had  multiple family meetings about her condition and prognosis and they all agreed that mom did not wished to be kept artificially alive on machines and wanted their mom to be kept comfortable only and discharge back to  with Hospice, which has been arranged and she is being discharged to [facility] with [facility] today. She was seen and examined today and deemed OK to be discharged.             Consults:   Consults         Status Ordering Provider     Inpatient consult to Cardiology  Once     Provider:  Ray Vilchis MD    Completed JACLYN HENDRICKS     Inpatient consult to Infectious Diseases  Once     Provider:  Vanessa Quinones MD    Completed JACLYN HENDRICKS     Inpatient consult to Pulmonology  Once     Provider:  Davide Clarke MD    Acknowledged CIARA REAVES     Inpatient consult to Social Work  Once     Provider:  (Not yet assigned)    Completed JACLYN HENDRICKS     IP consult to dietary  Once     Provider:  (Not yet assigned)    Completed JACLYN HENDRICKS     Pharmacy to dose Vancomycin consult  Once     Provider:  (Not yet assigned)    Acknowledged VANESSA QUINONES          Significant Diagnostic Studies: Labs:   BMP:   Recent Labs  Lab 03/05/17  0831   CREATININE 0.7   , CBC   Recent Labs  Lab 03/05/17  0831 03/06/17  0435   WBC 12.90* 12.58   HGB 11.2* 10.5*   HCT 34.8* 33.0*    250    and All labs within the past 24 hours have been reviewed  Microbiology:   Blood Culture   Lab Results   Component Value Date    LABBLOO No growth after 5 days. 02/28/2017   , Sputum Culture   Lab Results   Component Value Date    GSRESP >10 epithelial cells per low power field 02/26/2017    GSRESP Few WBC's 02/26/2017    GSRESP Few Gram negative rods 02/26/2017    GSRESP Few Gram positive rods 02/26/2017    GSRESP Many Gram positive cocci 02/26/2017    RESPIRATORYC  02/26/2017     METHICILLIN RESISTANT STAPHYLOCOCCUS AUREUS  Many      and Urine Culture    Lab Results   Component Value Date    LABURIN  ENTEROCOCCUS FAECALIS  > 100,000 cfu/ml   02/26/2017     Imaging Results         X-Ray Chest 1 View (Final result) Result time:  03/01/17 09:11:27    Final result by Víctor Mcdowell III, MD (03/01/17 09:11:27)    Impression:     Mildly worsening left lower lobe infiltrate.  Stable right lung infiltrates.        Electronically signed by: VÍCTOR MCDOWELL MD  Date:     03/01/17  Time:    09:11     Narrative:    XR CHEST 1 VIEW    Clinical history: Pneumonia.      Findings: Stable mild cardiomegaly. There has been no significant interval change the airspace consolidation in the right upper lobe and right lower lobe. There has been mild interval worsening of the airspace consolidation in the left lower lobe.  Infiltrates or other new pulmonary disease identified.            X-Ray Chest 1 View (Final result) Result time:  02/28/17 08:22:56    Final result by Colin Arroyo MD (02/28/17 08:22:56)    Impression:         Interval partial clearing of the left basilar infiltrate.      Electronically signed by: COLIN ARROYO MD  Date:     02/28/17  Time:    08:22     Narrative:    Exam: XR CHEST 1 VIEW    Clinical History:  Shortness of breath.  Pneumonia, subsequent catheter    Findings:     Comparison February 27, 2017.  Improved aeration and clearing of the left lower lobe.  No significant change in the patchy and confluent alveolar infiltrates scattered throughout the right lung.            X-Ray Chest 1 View (Final result) Result time:  02/27/17 09:00:17    Final result by Víctor Mcdowell III, MD (02/27/17 09:00:17)    Impression:           Mildly worsening multilobar pneumonia.      Electronically signed by: VÍCTOR MCDOWELL MD  Date:     02/27/17  Time:    09:00     Narrative:    XR CHEST 1 VIEW    Clinical Indication: Pneumonia.    Comparison: 02/26/2017    Findings: There has been mild interval worsening of the alveolar infiltrates in the right upper lobe and left lower lobe.  There has been no significant interval change of  the right lower lobe airspace consolidation. No   infiltrate, pneumothorax or other new pulmonary disease is identified.  Stable cardiomegaly.  Stable thickening of the right upper peritracheal stripe.            X-Ray Chest AP Portable (Final result) Result time:  02/26/17 10:06:54    Final result by Colin Arroyo MD (02/26/17 10:06:54)    Impression:     Multifocal pneumonia.            Electronically signed by: COLIN ARROYO MD  Date:     02/26/17  Time:    10:06     Narrative:    Exam: XR CHEST AP PORTABLE    Clinical History: Shortness of breath, acute onset    Findings:     Patchy alveolar infiltrates demonstrated throughout the right lung and in the left lung base.  Aortic atherosclerosis.  No pneumothorax. The cardiac silhouette is within normal limits.              Pending Diagnostic Studies:     None        Final Active Diagnoses:    Diagnosis Date Noted POA    PRINCIPAL PROBLEM:  Pneumonia of both lower lobes due to infectious organism [J18.9] 02/26/2017 Yes    Acute cystitis without hematuria [N30.00] 02/26/2017 Yes    Respiratory failure with hypoxia [J96.91] 03/06/2017 Yes    Bacteremia [R78.81] 03/02/2017 Yes    New onset a-fib [I48.91] 02/28/2017 No    Swallowing difficulty [R13.10] 02/27/2017 Yes    Vascular dementia without behavioral disturbance [F01.50] 02/26/2017 Yes     Chronic      Problems Resolved During this Admission:    Diagnosis Date Noted Date Resolved POA    Septic shock [A41.9, R65.21] 02/26/2017 02/27/2017 Yes    Acute respiratory failure with hypoxia [J96.01] 02/26/2017 02/28/2017 Yes    RICK (acute kidney injury) [N17.9] 02/26/2017 02/27/2017 Yes    V-tach [I47.2] 03/02/2017 03/06/2017 No    Acute renal failure [N17.9] 02/28/2017 03/02/2017 Yes    UTI (urinary tract infection) due to Enterococcus [N39.0, B95.2] 02/28/2017 03/04/2017 Yes      No new Assessment & Plan notes have been filed under this hospital service since the last note was generated.  Service: [facility]  Medicine      Discharged Condition: poor    Disposition: Hospice/Home    Follow Up:  Follow-up Information     Follow up with [facility].    Specialties:  Nursing Home Agency, SNF Agency    Why:  Nursing Home    Contact information:    [address] [place] [place] [address] [phone]          Follow up with [facility] .    Why:  Hospice at [facility]    Contact information:    [id number]  [place]  [address] [phone]        Patient Instructions:     Diet general   Scheduling Instructions: Pureed diet     Activity as tolerated       Medications:  Reconciled Home Medications:   Current Discharge Medication List      START taking these medications    Details   albuterol-ipratropium 2.5mg-0.5mg/3mL (DUO-NEB) 0.5 mg-3 mg(2.5 mg base)/3 mL nebulizer solution Take 3 mLs by nebulization every 6 (six) hours while awake. Rescue  Qty: 10 vial, Refills: 1      bisacodyl (DULCOLAX) 10 mg Supp Place 1 suppository (10 mg total) rectally daily as needed.  Refills: 0      minocycline (DYNACIN) 100 MG tablet Take 1 tablet (100 mg total) by mouth every 12 (twelve) hours.  Qty: 20 tablet, Refills: 0         CONTINUE these medications which have NOT CHANGED    Details   acetaminophen (TYLENOL) 325 MG tablet Take 325 mg by mouth every 6 (six) hours as needed for Pain.      carvedilol (COREG) 12.5 MG tablet Take 12.5 mg by mouth 2 (two) times daily with meals.      omeprazole (PRILOSEC) 20 MG capsule Take 20 mg by mouth once daily.      tramadol (ULTRAM) 50 mg tablet Take 50 mg by mouth every 6 (six) hours as needed for Pain.         STOP taking these medications       aspirin 81 MG Chew Comments:   Reason for Stopping:         atorvastatin (LIPITOR) 20 MG tablet Comments:   Reason for Stopping:         furosemide (LASIX) 40 MG tablet Comments:   Reason for Stopping:         loperamide (IMODIUM) 2 mg capsule Comments:   Reason for Stopping:         loratadine (CLARITIN) 10 mg tablet  Comments:   Reason for Stopping:         meloxicam (MOBIC) 7.5 MG tablet Comments:   Reason for Stopping:             Time spent on the discharge of patient: 51 minutes    Wyatt Schilling MD  Department of Hospital Medicine  [facility]

## 2017-03-06 NOTE — PLAN OF CARE
JENNIE received call from Dr Schilling.  Dr Schilling met with patient/family at bedside.  Family would like patient to return to [facility] with hospice.  CM contacted Constanza with [facility].  The hospice company used at [facility] is [facility].  Family is agreeable to [facility].  Choice form completed and placed in chart.  CM contacted Jeana with [employer] @[phone].  CM faxed appropriate documentation to [phone] via [employer]

## 2017-03-06 NOTE — PLAN OF CARE
03/06/17 1514   Final Note   Assessment Type Final Discharge Note   Discharge Disposition Nurse   Right Care Referral Info   Post Acute Recommendation Other   Referral Type Return to nursing home   Facility Name [place] [id number]   Received call from Lamar Apodaca. Patient will request [facility] for transport. Informed RN, provided number to call report.

## 2017-03-06 NOTE — PLAN OF CARE
Received call from Janie with [facility].  [facility] can not accept patient due equipment needed not available.  Patient will return to [facility].  Update to [place], primary nurse.  Awaiting call from Constanza with [facility].

## 2017-03-06 NOTE — PLAN OF CARE
Problem: Patient Care Overview  Goal: Plan of Care Review  Outcome: Ongoing (interventions implemented as appropriate)  Patient only oriented to self.  Vitals stable.  No complaints of pain. Contact precautions in place. TPN infusing. Patient has dark red blood in urine.  Patient being turned Q2H to prevent pressure ulcers. Bed alarm activated.  No falls on shift.  Welcome encouraged

## 2017-03-06 NOTE — PLAN OF CARE
Received call from Jeana with [facility].  All consents have been signed by family and is accepted.  CM spoke to Janie with [facility].  Patient will discharge to [facility].  Paperwork sent to Janie at [facility] via [facility].  Janie will call back with number for report and transportation time.    @1338 discussed discharge plan with [place].  Packet with number for report and transportation time given to [place]     03/06/17 1352   Final Note   Assessment Type Final Discharge Note   Discharge Disposition Nurse  (Discharge to [facility])   Hospital Follow Up  Appt(s) scheduled? No   Offered [facility] -- Bedside Delivery? n/a   Referral to Outpatient Case Management complete? n/a   Referral to / orders for Home Health [facility]? n/a   30 day supply of medicines given at discharge, if documented non-compliance / non-adherence? n/a   Any social issues identified prior to discharge? n/a

## 2017-03-06 NOTE — PROGRESS NOTES
Vancomycin Progress Notes:    Scr =0.7 trending down wbc = 12.9 tmax = 98.9 F  T1/2 = 20 hrs  Crcl = 36ml/min  Changing dosing to 750 mg iv q24hrs  Next trough due 3/7 @ 10 am  /Judy Bernard [facility] 3/5/2017 7:57 PM

## 2017-03-06 NOTE — SUBJECTIVE & OBJECTIVE
Interval History: 89 year old woman with pneumonia /sepsis.  Urine culture is now positive for enterococcus.  Resp culture is positive for MRSA.  Blood culture is positive for staph capitis.  Blood culture done on 02/28- MRSA in one bottle, another bottle -neg.  She is afebrile.  Family is considering palliative care.  HPI:  89 year old woman - NH resident with history of dementia, HTN, HLD, OA, GERD and Dementia who was brought to ER by EMS for progressive cough and SOB for 1 week. Per [facility], patient spO2 was 60% at the nursing home which increased to 77% on 4L O2. She also now has new onset A fib.  Since admission, chest x-ray done on 02/26 showed mildly worsening left lower lobe infiltrate.  Stable right lung infiltrates.  CBC showed wbc of 19.24.  Blood culture is positive for staph capitis, respiratory culture-MRSA,urine culture -enterococcus     She had bedside swallow evaluation and po diet of puree and thin liquid was recommended.  She was seen and examined at bedside.  History taken from electronic chart.        Review of Systems   Unable to perform ROS: Dementia     Objective:     Vital Signs (Most Recent):  Temp: 99.2 °F (37.3 °C) (03/06/17 0320)  Pulse: 89 (03/06/17 0320)  Resp: 18 (03/06/17 0320)  BP: 136/69 (03/06/17 0320)  SpO2: 95 % (03/06/17 0320) Vital Signs (24h Range):  Temp:  [98.2 °F (36.8 °C)-99.2 °F (37.3 °C)] 99.2 °F (37.3 °C)  Pulse:  [85-98] 89  Resp:  [16-28] 18  SpO2:  [95 %-98 %] 95 %  BP: (136-158)/(59-80) 136/69     Weight: 73.9 kg (162 lb 14.7 oz)  Body mass index is 31.82 kg/(m^2).    Estimated Creatinine Clearance: 45.1 mL/min (based on Cr of 0.7).    Physical Exam   Constitutional: She appears well-developed and well-nourished. No distress.   Elderly lady, AAOx1, pleasantly demented, NAD, has ch LE stasis changes   HENT:   Head: Normocephalic and atraumatic.   Mouth/Throat: Oropharynx is clear and moist.   Dry tongue   Eyes: Conjunctivae and EOM are normal. Pupils are equal, round,  and reactive to light.   Neck: Normal range of motion. Neck supple. No JVD present.   Cardiovascular: Intact distal pulses.  Exam reveals no gallop and no friction rub.    Murmur heard.  Irregular rhythm ,tachycardia   Pulmonary/Chest: No respiratory distress. She has no wheezes. She has rales.   Obvious chest congestion upon coughing   Abdominal: Soft. Bowel sounds are normal. She exhibits no distension. There is no tenderness.   Genitourinary:   Genitourinary Comments: Deferred, howell in place   Musculoskeletal: She exhibits no tenderness or deformity.   + R knee scar, LE stasis changes B   Lymphadenopathy:     She has no cervical adenopathy.   Neurological: She is alert. She has normal reflexes.   Pleasantly disoriented, [place], speech clear   Skin: Skin is warm and dry. No rash noted. She is not diaphoretic.   Psychiatric: She has a normal mood and affect. Her behavior is normal.   Nursing note and vitals reviewed.      Significant Labs:   Blood Culture:     Recent Labs  Lab 02/26/17  1015 02/26/17  1030 02/28/17  0636 02/28/17  0643   LABBLOO Gram stain aer bottle: Gram positive cocci in clusters resembling Staph  Results called to and read back by:Ree White RN 02/27/2017    17:33  STAPHYLOCOCCUS CAPITIS Gram stain aer bottle: Gram positive cocci in clusters resembling Staph   Results called to and read back by:João Russell RN 02/28/2017    12:24  Gram stain keith bottle: Gram positive cocci in clusters resembling Staph   02/28/2017  20:43  STAPHYLOCOCCUS CAPITIS Gram stain aer bottle: Gram positive cocci in clusters resembling Staph   Results called to and read back by: Didi Cox RN 03/02/2017  14:45  METHICILLIN RESISTANT STAPHYLOCOCCUS AUREUS No growth after 5 days.     CBC:     Recent Labs  Lab 03/04/17  0535 03/05/17  0831   WBC 13.53* 12.90*   HGB 10.9* 11.2*   HCT 34.1* 34.8*    244     CMP:     Recent Labs  Lab 03/05/17  0831   CREATININE 0.7   EGFRNONAA >60      Respiratory Culture:     Recent Labs  Lab 02/26/17  1644   GSRESP >10 epithelial cells per low power field  Few WBC's  Few Gram negative rods  Few Gram positive rods  Many Gram positive cocci   RESPIRATORYC METHICILLIN RESISTANT STAPHYLOCOCCUS AUREUSMany       Significant Imaging: I have reviewed all pertinent imaging results/findings within the past 24 hours.

## 2017-03-06 NOTE — PROGRESS NOTES
[facility]  Infectious Disease  Progress Note    Patient Name: Amy Guzman  MRN: [id number]  Admission Date: 2/26/2017  Length of Stay: 8 days  Attending Physician: Wyatt Schilling MD  Primary Care Provider: Primary Doctor No    Isolation Status: Contact  Assessment/Plan:      * Pneumonia of both lower lobes due to infectious organism  Respiratory cultures are positive for  MRstaph aureus   Blood cultures are positive for staph  Capitis   Day 8 of therapy .  Family /primary team is considering palliative/Hospice care .  zyvox or bactrim po will be another option if family opts for Hospice.        Anticipated Disposition:     Thank you for your consult. I will follow-up with patient. Please contact us if you have any additional questions.    Harinder Quinones MD  Infectious Disease  [facility]    Subjective:     Principal Problem:Pneumonia of both lower lobes due to infectious organism    Interval History: 89 year old woman with pneumonia /sepsis.  Urine culture is now positive for enterococcus.  Resp culture is positive for MRSA.  Blood culture is positive for staph capitis.  Blood culture done on 02/28- MRSA in one bottle, another bottle -neg.  She is afebrile.  Family is considering palliative care.  HPI:  89 year old woman - NH resident with history of dementia, HTN, HLD, OA, GERD and Dementia who was brought to ER by EMS for progressive cough and SOB for 1 week. Per [facility], patient spO2 was 60% at the nursing home which increased to 77% on 4L O2. She also now has new onset A fib.  Since admission, chest x-ray done on 02/26 showed mildly worsening left lower lobe infiltrate.  Stable right lung infiltrates.  CBC showed wbc of 19.24.  Blood culture is positive for staph capitis, respiratory culture-MRSA,urine culture -enterococcus     She had bedside swallow evaluation and po diet of puree and thin liquid was recommended.  She was seen and examined at bedside.  History taken from  electronic chart.        Review of Systems   Unable to perform ROS: Dementia     Objective:     Vital Signs (Most Recent):  Temp: 99.2 °F (37.3 °C) (03/06/17 0320)  Pulse: 89 (03/06/17 0320)  Resp: 18 (03/06/17 0320)  BP: 136/69 (03/06/17 0320)  SpO2: 95 % (03/06/17 0320) Vital Signs (24h Range):  Temp:  [98.2 °F (36.8 °C)-99.2 °F (37.3 °C)] 99.2 °F (37.3 °C)  Pulse:  [85-98] 89  Resp:  [16-28] 18  SpO2:  [95 %-98 %] 95 %  BP: (136-158)/(59-80) 136/69     Weight: 73.9 kg (162 lb 14.7 oz)  Body mass index is 31.82 kg/(m^2).    Estimated Creatinine Clearance: 45.1 mL/min (based on Cr of 0.7).    Physical Exam   Constitutional: She appears well-developed and well-nourished. No distress.   Elderly lady, AAOx1, pleasantly demented, NAD, has ch LE stasis changes   HENT:   Head: Normocephalic and atraumatic.   Mouth/Throat: Oropharynx is clear and moist.   Dry tongue   Eyes: Conjunctivae and EOM are normal. Pupils are equal, round, and reactive to light.   Neck: Normal range of motion. Neck supple. No JVD present.   Cardiovascular: Intact distal pulses.  Exam reveals no gallop and no friction rub.    Murmur heard.  Irregular rhythm ,tachycardia   Pulmonary/Chest: No respiratory distress. She has no wheezes. She has rales.   Obvious chest congestion upon coughing   Abdominal: Soft. Bowel sounds are normal. She exhibits no distension. There is no tenderness.   Genitourinary:   Genitourinary Comments: Deferred, howell in place   Musculoskeletal: She exhibits no tenderness or deformity.   + R knee scar, LE stasis changes B   Lymphadenopathy:     She has no cervical adenopathy.   Neurological: She is alert. She has normal reflexes.   Pleasantly disoriented, [place], speech clear   Skin: Skin is warm and dry. No rash noted. She is not diaphoretic.   Psychiatric: She has a normal mood and affect. Her behavior is normal.   Nursing note and vitals reviewed.      Significant Labs:   Blood Culture:     Recent Labs  Lab 02/26/17  1015  02/26/17  1030 02/28/17  0636 02/28/17  0643   LABBLOO Gram stain aer bottle: Gram positive cocci in clusters resembling Staph  Results called to and read back by:Ree White RN 02/27/2017    17:33  STAPHYLOCOCCUS CAPITIS Gram stain aer bottle: Gram positive cocci in clusters resembling Staph   Results called to and read back by:João Russell RN 02/28/2017    12:24  Gram stain keith bottle: Gram positive cocci in clusters resembling Staph   02/28/2017  20:43  STAPHYLOCOCCUS CAPITIS Gram stain aer bottle: Gram positive cocci in clusters resembling Staph   Results called to and read back by: Didi Cox RN 03/02/2017  14:45  METHICILLIN RESISTANT STAPHYLOCOCCUS AUREUS No growth after 5 days.     CBC:     Recent Labs  Lab 03/04/17  0535 03/05/17  0831   WBC 13.53* 12.90*   HGB 10.9* 11.2*   HCT 34.1* 34.8*    244     CMP:     Recent Labs  Lab 03/05/17  0831   CREATININE 0.7   EGFRNONAA >60     Respiratory Culture:     Recent Labs  Lab 02/26/17  1644   GSRESP >10 epithelial cells per low power field  Few WBC's  Few Gram negative rods  Few Gram positive rods  Many Gram positive cocci   RESPIRATORYC METHICILLIN RESISTANT STAPHYLOCOCCUS AUREUSMany       Significant Imaging: I have reviewed all pertinent imaging results/findings within the past 24 hours.

## 2017-03-06 NOTE — ASSESSMENT & PLAN NOTE
Respiratory cultures are positive for  MRstaph aureus   Blood cultures are positive for staph  Capitis   Day 8 of therapy .  Family /primary team is considering palliative/Hospice care .  zyvox or bactrim po will be another option if family opts for Hospice.

## 2017-03-08 NOTE — PROGRESS NOTES
[facility]  Infectious Disease  Progress Note    Patient Name: Amy Guzman  MRN: [id number]  Admission Date: 2/26/2017  Length of Stay: 8 days  Attending Physician: No att. providers found  Primary Care Provider: Primary Doctor No    Isolation Status: No active isolations  Assessment/Plan:      * Pneumonia of both lower lobes due to infectious organism  Respiratory cultures are positive for  MRstaph aureus   Blood cultures are positive for staph  Capitis   Day 9 of therapy .  Family /primary team is considering palliative/Hospice care   Ok to stop therapy if patient goes to Hospice as she cannot tolerate oral meds      Anticipated Disposition:     Thank you for your consult. I will sign off. Please contact us if you have any additional questions.    Harinder Quinones MD  Infectious Disease  [facility]    Subjective:     Principal Problem:Pneumonia of both lower lobes due to infectious organism    Interval History: 89 year old woman with pneumonia /sepsis.  Urine culture is now positive for enterococcus.  Resp culture is positive for MRSA.  Blood culture is positive for staph capitis.  Blood culture done on 02/28- MRSA in one bottle, another bottle -neg.  She is afebrile.  Family is considering palliative care.  She is afebrile   HPI:  89 year old woman - NH resident with history of dementia, HTN, HLD, OA, GERD and Dementia who was brought to ER by EMS for progressive cough and SOB for 1 week. Per [facility], patient spO2 was 60% at the nursing home which increased to 77% on 4L O2. She also now has new onset A fib.  Since admission, chest x-ray done on 02/26 showed mildly worsening left lower lobe infiltrate.  Stable right lung infiltrates.  CBC showed wbc of 19.24.  Blood culture is positive for staph capitis, respiratory culture-MRSA,urine culture -enterococcus     She had bedside swallow evaluation and po diet of puree and thin liquid was recommended.  She was seen and examined at  bedside.  History taken from electronic chart.        Review of Systems   Unable to perform ROS: Dementia     Objective:     Vital Signs (Most Recent):  Temp: 97.9 °F (36.6 °C) (03/06/17 1150)  Pulse: 84 (03/06/17 1334)  Resp: 18 (03/06/17 1334)  BP: (!) 127/54 (03/06/17 1150)  SpO2: 96 % (03/06/17 1334) Vital Signs (24h Range):        Weight: 73.9 kg (162 lb 14.7 oz)  Body mass index is 31.82 kg/(m^2).    Estimated Creatinine Clearance: 45.1 mL/min (based on Cr of 0.7).    Physical Exam   Constitutional: She appears well-developed and well-nourished. No distress.   Elderly lady, AAOx1, pleasantly demented, NAD, has ch LE stasis changes   HENT:   Head: Normocephalic and atraumatic.   Mouth/Throat: Oropharynx is clear and moist.   Dry tongue   Eyes: Conjunctivae and EOM are normal. Pupils are equal, round, and reactive to light.   Neck: Normal range of motion. Neck supple. No JVD present.   Cardiovascular: Intact distal pulses.  Exam reveals no gallop and no friction rub.    Murmur heard.  Irregular rhythm ,tachycardia   Pulmonary/Chest: No respiratory distress. She has no wheezes. She has rales.   Obvious chest congestion upon coughing   Abdominal: Soft. Bowel sounds are normal. She exhibits no distension. There is no tenderness.   Genitourinary:   Genitourinary Comments: Deferred, howell in place   Musculoskeletal: She exhibits no tenderness or deformity.   + R knee scar, LE stasis changes B   Lymphadenopathy:     She has no cervical adenopathy.   Neurological: She is alert. She has normal reflexes.   Pleasantly disoriented, [place], speech clear   Skin: Skin is warm and dry. No rash noted. She is not diaphoretic.   Psychiatric: She has a normal mood and affect. Her behavior is normal.   Nursing note and vitals reviewed.      Significant Labs:   Blood Culture:     Recent Labs  Lab 02/26/17  1015 02/26/17  1030 02/28/17  0636 02/28/17  0643   LABBLOO Gram stain aer bottle: Gram positive cocci in clusters resembling Staph   Results called to and read back by:Ree White,RN 02/27/2017    17:33  STAPHYLOCOCCUS CAPITIS Gram stain aer bottle: Gram positive cocci in clusters resembling Staph   Results called to and read back by:João Russell RN 02/28/2017    12:24  Gram stain keith bottle: Gram positive cocci in clusters resembling Staph   02/28/2017  20:43  STAPHYLOCOCCUS CAPITIS Gram stain aer bottle: Gram positive cocci in clusters resembling Staph   Results called to and read back by: Didi Cox RN 03/02/2017  14:45  METHICILLIN RESISTANT STAPHYLOCOCCUS AUREUS No growth after 5 days.     CBC:   No results for input(s): WBC, HGB, HCT, PLT in the last 48 hours.  CMP:   No results for input(s): NA, K, CL, CO2, GLU, BUN, CREATININE, CALCIUM, PROT, ALBUMIN, BILITOT, ALKPHOS, AST, ALT, ANIONGAP, EGFRNONAA in the last 48 hours.    Invalid input(s): ESTGF[place]  Respiratory Culture:     Recent Labs  Lab 02/26/17  1644   GSRESP >10 epithelial cells per low power field  Few WBC's  Few Gram negative rods  Few Gram positive rods  Many Gram positive cocci   RESPIRATORYC METHICILLIN RESISTANT STAPHYLOCOCCUS AUREUSMany       Significant Imaging: I have reviewed all pertinent imaging results/findings within the past 24 hours.

## 2017-03-08 NOTE — ASSESSMENT & PLAN NOTE
Respiratory cultures are positive for  MRstaph aureus   Blood cultures are positive for staph  Capitis   Day 9 of therapy .  Family /primary team is considering palliative/Hospice care   Ok to stop therapy if patient goes to Hospice as she cannot tolerate oral meds

## 2017-03-08 NOTE — SUBJECTIVE & OBJECTIVE
Interval History: 89 year old woman with pneumonia /sepsis.  Urine culture is now positive for enterococcus.  Resp culture is positive for MRSA.  Blood culture is positive for staph capitis.  Blood culture done on 02/28- MRSA in one bottle, another bottle -neg.  She is afebrile.  Family is considering palliative care.  She is afebrile   HPI:  89 year old woman - NH resident with history of dementia, HTN, HLD, OA, GERD and Dementia who was brought to ER by EMS for progressive cough and SOB for 1 week. Per [facility], patient spO2 was 60% at the nursing home which increased to 77% on 4L O2. She also now has new onset A fib.  Since admission, chest x-ray done on 02/26 showed mildly worsening left lower lobe infiltrate.  Stable right lung infiltrates.  CBC showed wbc of 19.24.  Blood culture is positive for staph capitis, respiratory culture-MRSA,urine culture -enterococcus     She had bedside swallow evaluation and po diet of puree and thin liquid was recommended.  She was seen and examined at bedside.  History taken from electronic chart.        Review of Systems   Unable to perform ROS: Dementia     Objective:     Vital Signs (Most Recent):  Temp: 97.9 °F (36.6 °C) (03/06/17 1150)  Pulse: 84 (03/06/17 1334)  Resp: 18 (03/06/17 1334)  BP: (!) 127/54 (03/06/17 1150)  SpO2: 96 % (03/06/17 1334) Vital Signs (24h Range):        Weight: 73.9 kg (162 lb 14.7 oz)  Body mass index is 31.82 kg/(m^2).    Estimated Creatinine Clearance: 45.1 mL/min (based on Cr of 0.7).    Physical Exam   Constitutional: She appears well-developed and well-nourished. No distress.   Elderly lady, AAOx1, pleasantly demented, NAD, has ch LE stasis changes   HENT:   Head: Normocephalic and atraumatic.   Mouth/Throat: Oropharynx is clear and moist.   Dry tongue   Eyes: Conjunctivae and EOM are normal. Pupils are equal, round, and reactive to light.   Neck: Normal range of motion. Neck supple. No JVD present.   Cardiovascular: Intact distal pulses.  Exam  reveals no gallop and no friction rub.    Murmur heard.  Irregular rhythm ,tachycardia   Pulmonary/Chest: No respiratory distress. She has no wheezes. She has rales.   Obvious chest congestion upon coughing   Abdominal: Soft. Bowel sounds are normal. She exhibits no distension. There is no tenderness.   Genitourinary:   Genitourinary Comments: Deferred, howell in place   Musculoskeletal: She exhibits no tenderness or deformity.   + R knee scar, LE stasis changes B   Lymphadenopathy:     She has no cervical adenopathy.   Neurological: She is alert. She has normal reflexes.   Pleasantly disoriented, [place], speech clear   Skin: Skin is warm and dry. No rash noted. She is not diaphoretic.   Psychiatric: She has a normal mood and affect. Her behavior is normal.   Nursing note and vitals reviewed.      Significant Labs:   Blood Culture:     Recent Labs  Lab 02/26/17  1015 02/26/17  1030 02/28/17  0636 02/28/17  0643   LABBLOO Gram stain aer bottle: Gram positive cocci in clusters resembling Staph  Results called to and read back by:Ree White RN 02/27/2017    17:33  STAPHYLOCOCCUS CAPITIS Gram stain aer bottle: Gram positive cocci in clusters resembling Staph   Results called to and read back by:João Russell RN 02/28/2017    12:24  Gram stain keith bottle: Gram positive cocci in clusters resembling Staph   02/28/2017  20:43  STAPHYLOCOCCUS CAPITIS Gram stain aer bottle: Gram positive cocci in clusters resembling Staph   Results called to and read back by: Didi Cox RN 03/02/2017  14:45  METHICILLIN RESISTANT STAPHYLOCOCCUS AUREUS No growth after 5 days.     CBC:   No results for input(s): WBC, HGB, HCT, PLT in the last 48 hours.  CMP:   No results for input(s): NA, K, CL, CO2, GLU, BUN, CREATININE, CALCIUM, PROT, ALBUMIN, BILITOT, ALKPHOS, AST, ALT, ANIONGAP, EGFRNONAA in the last 48 hours.    Invalid input(s): ESTGF[place]  Respiratory Culture:     Recent Labs  Lab 02/26/17  1644   GSRESP >10  epithelial cells per low power field  Few WBC's  Few Gram negative rods  Few Gram positive rods  Many Gram positive cocci   RESPIRATORYC METHICILLIN RESISTANT STAPHYLOCOCCUS AUREUSMany       Significant Imaging: I have reviewed all pertinent imaging results/findings within the past 24 hours.

## 2023-04-26 NOTE — ASSESSMENT & PLAN NOTE
- Positive for staph (awaiting sensitivities)  - Likely MRSA as MRSA is in her respiratory culture  - cont vanc     Mid-Level Procedure Text (A): After obtaining clear surgical margins the patient was sent to a mid-level provider for surgical repair.  The patient understands they will receive post-surgical care and follow-up from the mid-level provider.
